# Patient Record
Sex: MALE | Race: WHITE | NOT HISPANIC OR LATINO | Employment: FULL TIME | URBAN - METROPOLITAN AREA
[De-identification: names, ages, dates, MRNs, and addresses within clinical notes are randomized per-mention and may not be internally consistent; named-entity substitution may affect disease eponyms.]

---

## 2017-01-26 DIAGNOSIS — E78.5 HYPERLIPIDEMIA LDL GOAL <100: ICD-10-CM

## 2017-01-27 NOTE — TELEPHONE ENCOUNTER
lisinopril (PRINIVIL,ZESTRIL) 20 MG tablet      Last Written Prescription Date: 12/28/2015  Last Fill Quantity: 90, # refills: 3  [place] [facility] [facility] or [facility] prescribing provider: 2/8/2016       POTASSIUM   Date Value Ref Range Status   09/21/2015 4.0 3.4 - 5.3 mmol/L Final     CREATININE   Date Value Ref Range Status   09/21/2015 0.74 0.66 - 1.25 mg/dL Final     BP Readings from Last 3 Encounters:   02/08/16 118/80   01/14/16 139/89   12/30/15 132/82     atorvastatin (LIPITOR) 20 MG tablet     Last Written Prescription Date: 12/28/2015  Last Fill Quantity: 90, # refills: 3  [place] [facility] [facility] or [facility] prescribing provider: 2/8/2016       CHOL      202   1/16/2015  HDL       36   1/16/2015  LDL      128   1/16/2015  LDL      114   5/21/2009  TRIG      192   1/16/2015  CHOLHDLRATIO      5.6   1/16/2015  metFORMIN (GLUCOPHAGE) 500 MG tablet         Last Written Prescription Date: 12/28/2015  Last Fill Quantity: 360, # refills: 4  Last [place] [facility] [facility] or [facility] prescribing provider:  2/8/2016        BP Readings from Last 3 Encounters:   02/08/16 118/80   01/14/16 139/89   12/30/15 132/82     MICROL       <5   4/23/2015  No results found for this basename: microalbumin  CREATININE   Date Value Ref Range Status   09/21/2015 0.74 0.66 - 1.25 mg/dL Final   ]  GFR ESTIMATE   Date Value Ref Range Status   09/21/2015 >90  Non  GFR Calc   >60 mL/min/1.7m2 Final   01/16/2015 >90  Non  GFR Calc   >60 mL/min/1.7m2 Final   04/13/2010 85 >60 mL/min/1.7m2 Final     GFR ESTIMATE IF BLACK   Date Value Ref Range Status   09/21/2015 >90   GFR Calc   >60 mL/min/1.7m2 Final   01/16/2015 >90   GFR Calc   >60 mL/min/1.7m2 Final   04/13/2010 >90 >60 mL/min/1.7m2 Final     CHOL      202   1/16/2015  HDL       36   1/16/2015  LDL      128   1/16/2015  LDL      114   5/21/2009  TRIG      192   1/16/2015  CHOLHDLRATIO      5.6    1/16/2015  No results found for this basename: ast  ALT       40   12/21/2009  A1C     11.5   12/28/2015  A1C     10.4   9/21/2015  A1C     12.2   4/23/2015  A1C     13.4   1/15/2015  A1C      6.7   4/13/2010  POTASSIUM   Date Value Ref Range Status   09/21/2015 4.0 3.4 - 5.3 mmol/L Final

## 2017-01-30 RX ORDER — ATORVASTATIN CALCIUM 20 MG/1
TABLET, FILM COATED ORAL
Qty: 90 TABLET | Refills: 0 | Status: SHIPPED | OUTPATIENT
Start: 2017-01-30 | End: 2018-04-26

## 2017-01-30 RX ORDER — LISINOPRIL 20 MG/1
TABLET ORAL
Qty: 90 TABLET | Refills: 0 | Status: SHIPPED | OUTPATIENT
Start: 2017-01-30 | End: 2017-04-04

## 2017-01-30 NOTE — TELEPHONE ENCOUNTER
Medication is being filled for 1 time refill only due to:  Patient needs to be seen because it will be over a year since last seen in Feb.     Signed Prescriptions:                        Disp   Refills    metFORMIN (GLUCOPHAGE) 500 MG tablet       360 ta*0        Sig: TAKE 2 TABLETS BY MOUTH TWICE DAILY WITH MEALS  Authorizing Provider: CEZAR ROBLES  Ordering User: VANI JULIAN    atorvastatin (LIPITOR) 20 MG tablet        90 tab*0        Sig: TAKE 1 TABLET BY MOUTH EVERY DAY.  Authorizing Provider: CEZAR ROBLES  Ordering User: VANI JULIAN    lisinopril (PRINIVIL/ZESTRIL) 20 MG tablet 90 tab*0        Sig: TAKE 1 TABLET BY MOUTH EVERY DAY.  Authorizing Provider: CEZAR ROBLES  Ordering User: VANI JULIAN RN, BSN

## 2017-02-08 ENCOUNTER — TELEPHONE (OUTPATIENT)
Dept: FAMILY MEDICINE | Facility: CLINIC | Age: 52
End: 2017-02-08

## 2017-02-08 DIAGNOSIS — E78.5 HYPERLIPIDEMIA LDL GOAL <100: ICD-10-CM

## 2017-02-08 RX ORDER — LISINOPRIL 20 MG/1
TABLET ORAL
Qty: 90 TABLET | Refills: 0 | Status: CANCELLED | OUTPATIENT
Start: 2017-02-08

## 2017-02-08 RX ORDER — ATORVASTATIN CALCIUM 20 MG/1
TABLET, FILM COATED ORAL
Qty: 90 TABLET | Refills: 0 | Status: CANCELLED | OUTPATIENT
Start: 2017-02-08

## 2017-02-08 NOTE — TELEPHONE ENCOUNTER
Per Patient:   Additional comments: Please call if some can be filled. Can you supply some till April? He can not afford a Dr visit now. He is feeling good at this time. 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 He is watching his diet        Metformin 500mg         Last Written Prescription Date: 01/30/2017  Last Fill Quantity: 360, # refills: 0  Last Office Visit with [facility], [facility] or [facility] prescribing provider:  02/06/2016        BP Readings from Last 3 Encounters:   02/08/16 118/80   01/14/16 139/89   12/30/15 132/82     MICROL       <5   4/23/2015  No results found for this basename: microalbumin  CREATININE   Date Value Ref Range Status   09/21/2015 0.74 0.66 - 1.25 mg/dL Final   ]  GFR ESTIMATE   Date Value Ref Range Status   09/21/2015 >90  Non  GFR Calc   >60 mL/min/1.7m2 Final   01/16/2015 >90  Non  GFR Calc   >60 mL/min/1.7m2 Final   04/13/2010 85 >60 mL/min/1.7m2 Final     GFR ESTIMATE IF BLACK   Date Value Ref Range Status   09/21/2015 >90   GFR Calc   >60 mL/min/1.7m2 Final   01/16/2015 >90   GFR Calc   >60 mL/min/1.7m2 Final   04/13/2010 >90 >60 mL/min/1.7m2 Final     CHOL      202   1/16/2015  HDL       36   1/16/2015  LDL      128   1/16/2015  LDL      114   5/21/2009  TRIG      192   1/16/2015  CHOLHDLRATIO      5.6   1/16/2015  No results found for this basename: ast  ALT       40   12/21/2009  A1C     11.5   12/28/2015  A1C     10.4   9/21/2015  A1C     12.2   4/23/2015  A1C     13.4   1/15/2015  A1C      6.7   4/13/2010  POTASSIUM   Date Value Ref Range Status   09/21/2015 4.0 3.4 - 5.3 mmol/L Final       Lisinopril 20mg       Last Written Prescription Date: 01/30/2017  Last Fill Quantity: 90, # refills: 0  Last Office Visit with [facility], [facility] or [facility] prescribing provider: 02/06/2016       POTASSIUM   Date Value Ref Range Status   09/21/2015 4.0 3.4 - 5.3 mmol/L Final     CREATININE   Date Value Ref Range Status   09/21/2015 0.74  0.66 - 1.25 mg/dL Final     BP Readings from Last 3 Encounters:   02/08/16 118/80   01/14/16 139/89   12/30/15 132/82     Atorvastatin 20mg      Last Written Prescription Date: 01/30/2017  Last Fill Quantity: 90, # refills: 0  Last Office Visit with [facility] or [facility] prescribing provider: 02/06/2016       CHOL      202   1/16/2015  HDL       36   1/16/2015  LDL      128   1/16/2015  LDL      114   5/21/2009  TRIG      192   1/16/2015  CHOLHDLRATIO      5.6   1/16/2015  Dorothy Beyer MA

## 2017-02-08 NOTE — TELEPHONE ENCOUNTER
Reason for call: Other   Patient called regarding (reason for call): prescription refills on metformin 500 mg, lisinopril 20 mg, and atorvastatin 20 mg  Additional comments: Please call if some can be filled. Can you supply some till April? He can not afford a Dr visit now. He is feeling good at this time. 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 He is watching his diet    Phone Number Pt can be reached at:[phone]  Best Time: anytime or 2:45-4 pm is best for talking  Can we leave a detailed message on this number? YES

## 2017-02-08 NOTE — TELEPHONE ENCOUNTER
Routing refill request to provider for review/approval because:  Labs not current:  Diabetic labs,   Patient due for f/u visit- see note below patient cannot afford visit at this time.     Radha Calderon RN

## 2017-02-11 NOTE — TELEPHONE ENCOUNTER
Call him. He must be seen. I haven't seen him in over 1 year. There is no way I can prescribe medications for him if I don't have him in the office for an exam, vital signs, and lab tests.     CEZAR ROBLES M.D.

## 2017-04-04 ENCOUNTER — OFFICE VISIT (OUTPATIENT)
Dept: FAMILY MEDICINE | Facility: CLINIC | Age: 52
End: 2017-04-04
Payer: COMMERCIAL

## 2017-04-04 VITALS
TEMPERATURE: 98.1 F | BODY MASS INDEX: 28.44 KG/M2 | HEIGHT: 69 IN | OXYGEN SATURATION: 98 % | SYSTOLIC BLOOD PRESSURE: 135 MMHG | HEART RATE: 84 BPM | WEIGHT: 192 LBS | DIASTOLIC BLOOD PRESSURE: 87 MMHG

## 2017-04-04 DIAGNOSIS — Z00.00 ROUTINE GENERAL MEDICAL EXAMINATION AT A HEALTH CARE FACILITY: Primary | ICD-10-CM

## 2017-04-04 DIAGNOSIS — E66.3 OVERWEIGHT (BMI 25.0-29.9): ICD-10-CM

## 2017-04-04 DIAGNOSIS — Z13.89 SCREENING FOR DIABETIC PERIPHERAL NEUROPATHY: ICD-10-CM

## 2017-04-04 DIAGNOSIS — E78.5 HYPERLIPIDEMIA LDL GOAL <100: ICD-10-CM

## 2017-04-04 DIAGNOSIS — Z11.59 NEED FOR HEPATITIS C SCREENING TEST: ICD-10-CM

## 2017-04-04 DIAGNOSIS — I10 HYPERTENSION GOAL BP (BLOOD PRESSURE) < 140/90: ICD-10-CM

## 2017-04-04 LAB
ALBUMIN UR-MCNC: NEGATIVE MG/DL
APPEARANCE UR: CLEAR
BASOPHILS # BLD AUTO: 0.1 10E9/L (ref 0–0.2)
BASOPHILS NFR BLD AUTO: 0.7 %
BILIRUB UR QL STRIP: NEGATIVE
COLOR UR AUTO: YELLOW
DIFFERENTIAL METHOD BLD: ABNORMAL
EOSINOPHIL # BLD AUTO: 0.4 10E9/L (ref 0–0.7)
EOSINOPHIL NFR BLD AUTO: 4.5 %
ERYTHROCYTE [DISTWIDTH] IN BLOOD BY AUTOMATED COUNT: 12.8 % (ref 10–15)
GLUCOSE BLD-MCNC: 217 MG/DL (ref 70–99)
GLUCOSE UR STRIP-MCNC: >=1000 MG/DL
HBA1C MFR BLD: 11.4 % (ref 4.3–6)
HCT VFR BLD AUTO: 42 % (ref 40–53)
HGB BLD-MCNC: 15.5 G/DL (ref 13.3–17.7)
HGB UR QL STRIP: NEGATIVE
KETONES UR STRIP-MCNC: NEGATIVE MG/DL
LEUKOCYTE ESTERASE UR QL STRIP: NEGATIVE
LYMPHOCYTES # BLD AUTO: 4.2 10E9/L (ref 0.8–5.3)
LYMPHOCYTES NFR BLD AUTO: 44.2 %
MCH RBC QN AUTO: 30.9 PG (ref 26.5–33)
MCHC RBC AUTO-ENTMCNC: 36.9 G/DL (ref 31.5–36.5)
MCV RBC AUTO: 84 FL (ref 78–100)
MONOCYTES # BLD AUTO: 0.6 10E9/L (ref 0–1.3)
MONOCYTES NFR BLD AUTO: 6.5 %
NEUTROPHILS # BLD AUTO: 4.2 10E9/L (ref 1.6–8.3)
NEUTROPHILS NFR BLD AUTO: 44.1 %
NITRATE UR QL: NEGATIVE
PH UR STRIP: 6 PH (ref 5–7)
PLATELET # BLD AUTO: 261 10E9/L (ref 150–450)
RBC # BLD AUTO: 5.01 10E12/L (ref 4.4–5.9)
SP GR UR STRIP: <=1.005 (ref 1–1.03)
URN SPEC COLLECT METH UR: ABNORMAL
UROBILINOGEN UR STRIP-ACNC: 0.2 EU/DL (ref 0.2–1)
WBC # BLD AUTO: 9.6 10E9/L (ref 4–11)

## 2017-04-04 PROCEDURE — 99213 OFFICE O/P EST LOW 20 MIN: CPT | Mod: 25 | Performed by: INTERNAL MEDICINE

## 2017-04-04 PROCEDURE — 80050 GENERAL HEALTH PANEL: CPT | Performed by: INTERNAL MEDICINE

## 2017-04-04 PROCEDURE — 86803 HEPATITIS C AB TEST: CPT | Performed by: INTERNAL MEDICINE

## 2017-04-04 PROCEDURE — 36415 COLL VENOUS BLD VENIPUNCTURE: CPT | Performed by: INTERNAL MEDICINE

## 2017-04-04 PROCEDURE — 82043 UR ALBUMIN QUANTITATIVE: CPT | Performed by: INTERNAL MEDICINE

## 2017-04-04 PROCEDURE — 80061 LIPID PANEL: CPT | Performed by: INTERNAL MEDICINE

## 2017-04-04 PROCEDURE — 99207 C FOOT EXAM  NO CHARGE: CPT | Mod: 25 | Performed by: INTERNAL MEDICINE

## 2017-04-04 PROCEDURE — 81003 URINALYSIS AUTO W/O SCOPE: CPT | Performed by: INTERNAL MEDICINE

## 2017-04-04 PROCEDURE — 99396 PREV VISIT EST AGE 40-64: CPT | Performed by: INTERNAL MEDICINE

## 2017-04-04 PROCEDURE — 83036 HEMOGLOBIN GLYCOSYLATED A1C: CPT | Performed by: INTERNAL MEDICINE

## 2017-04-04 PROCEDURE — 82947 ASSAY GLUCOSE BLOOD QUANT: CPT | Performed by: INTERNAL MEDICINE

## 2017-04-04 RX ORDER — ATORVASTATIN CALCIUM 40 MG/1
40 TABLET, FILM COATED ORAL DAILY
Qty: 90 TABLET | Refills: 3 | Status: SHIPPED | OUTPATIENT
Start: 2017-04-04 | End: 2018-04-18

## 2017-04-04 RX ORDER — GLIPIZIDE 5 MG/1
5 TABLET ORAL 2 TIMES DAILY WITH MEALS
Qty: 180 TABLET | Refills: 1 | Status: SHIPPED | OUTPATIENT
Start: 2017-04-04 | End: 2017-12-01

## 2017-04-04 RX ORDER — LISINOPRIL 20 MG/1
20 TABLET ORAL DAILY
Qty: 90 TABLET | Refills: 3 | Status: SHIPPED | OUTPATIENT
Start: 2017-04-04 | End: 2018-04-18

## 2017-04-04 NOTE — NURSING NOTE
"Chief Complaint   Patient presents with     Physical       Initial /87 (BP Location: Right arm, Patient Position: Chair, Cuff Size: Adult Regular)  Pulse 84  Temp 98.1  F (36.7  C) (Oral)  Ht 5' 8.5\" (1.74 m)  Wt 192 lb (87.1 kg)  SpO2 98%  BMI 28.77 kg/m2 Estimated body mass index is 28.77 kg/(m^2) as calculated from the following:    Height as of this encounter: 5' 8.5\" (1.74 m).    Weight as of this encounter: 192 lb (87.1 kg).  Medication Reconciliation: complete     Feliz Altamirano MA      "

## 2017-04-04 NOTE — PATIENT INSTRUCTIONS
Preventive Health Recommendations  Male Ages 50   64    Yearly exam:             See your health care provider every year in order to  o   Review health changes.   o   Discuss preventive care.    o   Review your medicines if your doctor has prescribed any.     Have a cholesterol test every 5 years, or more frequently if you are at risk for high cholesterol/heart disease.     Have a diabetes test (fasting glucose) every three years. If you are at risk for diabetes, you should have this test more often.     Have a colonoscopy at age 50, or have a yearly FIT test (stool test). These exams will check for colon cancer.      Talk with your health care provider about whether or not a prostate cancer screening test (PSA) is right for you.    You should be tested each year for STDs (sexually transmitted diseases), if you re at risk.     Shots: Get a flu shot each year. Get a tetanus shot every 10 years.     Nutrition:    Eat at least 5 servings of fruits and vegetables daily.     Eat whole-grain bread, whole-wheat pasta and brown rice instead of white grains and rice.     Talk to your provider about Calcium and Vitamin D.     Lifestyle    Exercise for at least 150 minutes a week (30 minutes a day, 5 days a week). This will help you control your weight and prevent disease.     Limit alcohol to one drink per day.     No smoking.     Wear sunscreen to prevent skin cancer.     See your dentist every six months for an exam and cleaning.     See your eye doctor every 1 to 2 years.  This summary includes the important diagnoses, test, medications and other important parts of your medical history.  Below are a few good we sites you can use to learn more about these.     Www.[employer].org : Up to date and easily searchable information on multiple topics.  Www.[employer].org/Pharmacy/c_539084.asp : [employer] [facility] $4.99 medications  Www.CatchTheEye.gov : medication info, interactive tutorials, watch real surgeries  online  Www.familydoctor.org : good info from the Academy of Family Physicians  Www.mayoclinic.com : good info from the [facility]  Www.cdc.gov : public health info, travel advisories, epidemics (H1N1)  Www.aap.org : children's health info, normal development, vaccinations  Www.health.state.[place].us : [place] dept of heat, public health issues in [place], N1N1    Based on your medical history and these are the current health maintenance or preventive care services that you are due for (some may have been done at this visit:)  Health Maintenance Due   Topic Date Due     HEPATITIS C SCREENING  09[birth year]07[birth year]1981     LIPID MONITORING Q1 YEAR( NO INBASKET)  01/16/2016     MICROALBUMIN Q1 YEAR( NO INBASKET)  04/23/2016     EYE EXAM Q1 YEAR( NO INBASKET)  06/02/2016     A1C Q6 MO( NO INBASKET)  06/28/2016     CREATININE Q1 YEAR (NO INBASKET)  09/21/2016     FOOT EXAM Q1 YEAR( NO INBASKET)  12/28/2016     TSH W/ FREE T4 REFLEX Q2 YEAR (NO INBASKET)  04/23/2017     =================================================================================  Normal Values   Blood pressure  <140/90 for most adults    <130/80 for some chronic diseases (ask your care team about yours)    BMI (body mass index)  18.5-25 kg/m2 (based on height and weight)     Thank you for visiting [facility]    Normal or non-critical lab and imaging results will be communicated to you by MyChart, letter or phone within 7 days.  If you do not hear from us within 10 days, please call the clinic. If you have a critical or abnormal lab result, we will notify you by phone as soon as possible.     If you have any questions regarding your visit please contact:     Team Comfort:   Clinic Hours Telephone Number   Dr. Johnny Santiago   7am-5pm  Monday - Friday [phone]  [facility]   Pharmacy 8am-8pm Monday-Thursday      8am-6pm Friday  9am-5pm Saturday-Sunday [phone]   Urgent Care  11am-8pm Monday-Friday        9am-5pm Saturday-Sunday [phone]     After hours, weekend or if you need to make an appointment with your primary provider please call [phone].   After Hours nurse advise: call [facility]: [phone]    Medication Refills:  Call your pharmacy and they will forward the refill to us. Please allow 3 business days for your refills to be completed.    Use [email or web address] (secure email communication and access to your chart) to send your primary care provider a message or make an appointment. Ask someone on your Team how to sign up for [email or web address]. To log on to [employer] or for more information in [employer] please visit the website at [email or web address].  As of October 8, 2013, all password changes, disabled accounts, or ID changes in [email or web address] will be done by our Access Services Department.   If you need help with your account or password, call: [phone]. Clinic staff no longer has the ability to change passwords.

## 2017-04-04 NOTE — MR AVS SNAPSHOT
After Visit Summary   4/4/2017    Samson Camarillo    MRN: [id number]           Patient Information     Date Of Birth          1965        Visit Information        Provider Department      4/4/2017 5:00 PM Mick Mariano MD [facility]        Today's Diagnoses     Routine general medical examination at a health care facility    -  1    Uncontrolled type 2 diabetes with neuropathy (H)        Hypertension goal BP (blood pressure) < 140/90        Need for hepatitis C screening test        Screening for diabetic peripheral neuropathy        Needs smoking cessation education        Hyperlipidemia LDL goal <100        Overweight (BMI 25.0-29.9)          Care Instructions      Preventive Health Recommendations  Male Ages 50 - 64    Yearly exam:             See your health care provider every year in order to  o   Review health changes.   o   Discuss preventive care.    o   Review your medicines if your doctor has prescribed any.     Have a cholesterol test every 5 years, or more frequently if you are at risk for high cholesterol/heart disease.     Have a diabetes test (fasting glucose) every three years. If you are at risk for diabetes, you should have this test more often.     Have a colonoscopy at age 50, or have a yearly FIT test (stool test). These exams will check for colon cancer.      Talk with your health care provider about whether or not a prostate cancer screening test (PSA) is right for you.    You should be tested each year for STDs (sexually transmitted diseases), if you re at risk.     Shots: Get a flu shot each year. Get a tetanus shot every 10 years.     Nutrition:    Eat at least 5 servings of fruits and vegetables daily.     Eat whole-grain bread, whole-wheat pasta and brown rice instead of white grains and rice.     Talk to your provider about Calcium and Vitamin D.     Lifestyle    Exercise for at least 150 minutes a week (30 minutes a day, 5 days a week). This  will help you control your weight and prevent disease.     Limit alcohol to one drink per day.     No smoking.     Wear sunscreen to prevent skin cancer.     See your dentist every six months for an exam and cleaning.     See your eye doctor every 1 to 2 years.  This summary includes the important diagnoses, test, medications and other important parts of your medical history.  Below are a few good we sites you can use to learn more about these.     Www.Geliyoo.OPTIMIZERx : Up to date and easily searchable information on multiple topics.  Www.Geliyoo.OPTIMIZERx/Pharmacy/c_539084.asp : [facility] $4.99 medications  Www.Performable.gov : medication info, interactive tutorials, watch real surgeries online  Www.familydoctor.org : good info from the Academy of Family Physicians  Www.TeachScape.Capevo : good info from the [facility]  Www.cdc.gov : public health info, travel advisories, epidemics (H1N1)  Www.aap.org : children's health info, normal development, vaccinations  Www.health.[facility].[place].us : [place] dept of heatl, public health issues in [place], N1N1    Based on your medical history and these are the current health maintenance or preventive care services that you are due for (some may have been done at this visit:)  Health Maintenance Due   Topic Date Due     HEPATITIS C SCREENING  09/07/1981     LIPID MONITORING Q1 YEAR( NO INBASKET)  01/16/2016     MICROALBUMIN Q1 YEAR( NO INBASKET)  04/23/2016     EYE EXAM Q1 YEAR( NO INBASKET)  06/02/2016     A1C Q6 MO( NO INBASKET)  06/28/2016     CREATININE Q1 YEAR (NO INBASKET)  09/21/2016     FOOT EXAM Q1 YEAR( NO INBASKET)  12/28/2016     TSH W/ FREE T4 REFLEX Q2 YEAR (NO INBASKET)  04/23/2017     =================================================================================  Normal Values   Blood pressure  <140/90 for most adults    <130/80 for some chronic diseases (ask your care team about yours)    BMI (body mass index)  18.5-25 kg/m2 (based on height and weight)     Thank you  for visiting [facility]    Normal or non-critical lab and imaging results will be communicated to you by MyChart, letter or phone within 7 days.  If you do not hear from us within 10 days, please call the clinic. If you have a critical or abnormal lab result, we will notify you by phone as soon as possible.     If you have any questions regarding your visit please contact:     Team Comfort:   Clinic Hours Telephone Number   Dr. Johnny Santiago   7am-5pm  Monday - Friday [phone]  Efrain RN   Pharmacy 8am-8pm Monday-Thursday      8am-6pm Friday  9am-5pm Saturday-Sunday [phone]   Urgent Care 11am-8pm Monday-Friday        9am-5pm Saturday-Sunday [phone]     After hours, weekend or if you need to make an appointment with your primary provider please call [phone].   After Hours nurse advise: call [facility]: [phone]    Medication Refills:  Call your pharmacy and they will forward the refill to us. Please allow 3 business days for your refills to be completed.    Use Fortem (secure email communication and access to your chart) to send your primary care provider a message or make an appointment. Ask someone on your Team how to sign up for Fortem. To log on to BountyHunter or for more information in LiveOps please visit the website at [email or web address].  As of October 8, 2013, all password changes, disabled accounts, or ID changes in Fortem/MyHealth will be done by our Access Services Department.   If you need help with your account or password, call: [phone]. Clinic staff no longer has the ability to change passwords.           Follow-ups after your visit        Who to contact     If you have questions or need follow up information about today's clinic visit or your schedule please contact [facility] directly at [phone].  Normal or non-critical lab and  "imaging results will be communicated to you by MyChart, letter or phone within 4 business days after the clinic has received the results. If you do not hear from us within 7 days, please contact the clinic through Graftworxt or phone. If you have a critical or abnormal lab result, we will notify you by phone as soon as possible.  Submit refill requests through [email or web address] or call your pharmacy and they will forward the refill request to us. Please allow 3 business days for your refill to be completed.          Additional Information About Your Visit        Enviable AbodeharLumiGrow Information     [email or web address] lets you send messages to your doctor, view your test results, renew your prescriptions, schedule appointments and more. To sign up, go to [email or web address] . Click on \"Log in\" on the left side of the screen, which will take you to the Welcome page. Then click on \"Sign up Now\" on the right side of the page.     You will be asked to enter the access code listed below, as well as some personal information. Please follow the directions to create your username and password.     Your access code is: [email or web address]  Expires: 7/3/2017  4:51 PM     Your access code will  in 90 days. If you need help or a new code, please call your [facility] clinic or [phone].        Care EveryWhere ID     This is your Care EveryWhere ID. This could be used by other organizations to access your [facility] medical records  [id number]        Your Vitals Were     Pulse Temperature Height Pulse Oximetry BMI (Body Mass Index)       84 98.1  F (36.7  C) (Oral) 5' 8.5\" (1.74 m) 98% 28.77 kg/m2        Blood Pressure from Last 3 Encounters:   17 135/87   16 118/80   16 139/89    Weight from Last 3 Encounters:   17 192 lb (87.1 kg)   16 186 lb (84.4 kg)   16 184 lb 3.2 oz (83.6 kg)              We Performed the Following     Albumin Random Urine Quantitative     CBC with platelets and differential     Comprehensive metabolic " panel (BMP + Alb, Alk Phos, ALT, AST, Total. Bili, TP)     FOOT EXAM  NO CHARGE [13502.114]     FOOT EXAM     Glucose, whole blood     Hemoglobin A1c     Hepatitis C Screen Reflex to HCV RNA Quant and Genotype     Lipid panel reflex to direct LDL     TSH WITH FREE T4 REFLEX     UA reflex to Microscopic          Today's Medication Changes          These changes are accurate as of: 4/4/17  6:48 PM.  If you have any questions, ask your nurse or doctor.               Start taking these medicines.        Dose/Directions    glipiZIDE 5 MG tablet   Commonly known as:  GLUCOTROL   Used for:  Uncontrolled type 2 diabetes with neuropathy (H)   Started by:  Mick Mariano MD        Dose:  5 mg   Take 1 tablet (5 mg) by mouth 2 times daily (with meals) Take with Metformin.   Quantity:  180 tablet   Refills:  1       metFORMIN 1000 MG tablet   Commonly known as:  GLUCOPHAGE   Used for:  Uncontrolled type 2 diabetes with neuropathy (H)   Started by:  Mick Mariano MD        Dose:  1000 mg   Take 1 tablet (1,000 mg) by mouth 2 times daily (with meals)   Quantity:  180 tablet   Refills:  1         These medicines have changed or have updated prescriptions.        Dose/Directions    * atorvastatin 20 MG tablet   Commonly known as:  LIPITOR   This may have changed:  Another medication with the same name was added. Make sure you understand how and when to take each.   Used for:  Hyperlipidemia LDL goal <100   Changed by:  Pippa Quiroz MD        TAKE 1 TABLET BY MOUTH EVERY DAY.   Quantity:  90 tablet   Refills:  0       * atorvastatin 40 MG tablet   Commonly known as:  LIPITOR   This may have changed:  You were already taking a medication with the same name, and this prescription was added. Make sure you understand how and when to take each.   Used for:  Hyperlipidemia LDL goal <100   Changed by:  Mick Mariano MD        Dose:  40 mg   Take 1 tablet (40 mg) by mouth daily   Quantity:  90 tablet    Refills:  3       lisinopril 20 MG tablet   Commonly known as:  PRINIVIL/ZESTRIL   This may have changed:  See the new instructions.   Used for:  Hypertension goal BP (blood pressure) < 140/90   Changed by:  Mick Mariano MD        Dose:  20 mg   Take 1 tablet (20 mg) by mouth daily   Quantity:  90 tablet   Refills:  3       * Notice:  This list has 2 medication(s) that are the same as other medications prescribed for you. Read the directions carefully, and ask your doctor or other care provider to review them with you.         Where to get your medicines      These medications were sent to [id number] - [place], [place] - [address] [facility]  [address] , [place] [address]    Hours:  24-hours Phone:  [phone]     atorvastatin 40 MG tablet    glipiZIDE 5 MG tablet    lisinopril 20 MG tablet    metFORMIN 1000 MG tablet                Primary Care Provider Office Phone # Fax #    Mick Mariano [phone]       [facility] [address]  [facility] [address]        Thank you!     Thank you for choosing [facility]  for your care. Our goal is always to provide you with excellent care. Hearing back from our patients is one way we can continue to improve our services. Please take a few minutes to complete the written survey that you may receive in the mail after your visit with us. Thank you!             Your Updated Medication List - Protect others around you: Learn how to safely use, store and throw away your medicines at www.disposemymeds.org.          This list is accurate as of: 4/4/17  6:48 PM.  Always use your most recent med list.                   Brand Name Dispense Instructions for use    aspirin 81 MG tablet      Take by mouth daily (with dinner)       * atorvastatin 20 MG tablet    LIPITOR    90 tablet    TAKE 1 TABLET BY MOUTH EVERY DAY.       * atorvastatin 40  MG tablet    LIPITOR    90 tablet    Take 1 tablet (40 mg) by mouth daily       SONA CONTOUR test strip   Generic drug:  blood glucose monitoring     200 each    TEST TWICE DAILY       glipiZIDE 5 MG tablet    GLUCOTROL    180 tablet    Take 1 tablet (5 mg) by mouth 2 times daily (with meals) Take with Metformin.       lisinopril 20 MG tablet    PRINIVIL/ZESTRIL    90 tablet    Take 1 tablet (20 mg) by mouth daily       metFORMIN 1000 MG tablet    GLUCOPHAGE    180 tablet    Take 1 tablet (1,000 mg) by mouth 2 times daily (with meals)       nicotine 14 MG/24HR 24 hr patch    NICODERM CQ    30 patch    Place 1 patch onto the skin every 24 hours       order for DME     1 each    Equipment being ordered:  Glucose monitor and test strips to match . Must test twice a day Dispense one kit and 3 months of test strips with 4 refills.       * Notice:  This list has 2 medication(s) that are the same as other medications prescribed for you. Read the directions carefully, and ask your doctor or other care provider to review them with you.

## 2017-04-04 NOTE — PROGRESS NOTES
SUBJECTIVE:     CC: Samson Camarillo is an 51 year old male who presents for preventative health visit.     Healthy Habits:    Do you get at least three servings of calcium containing foods daily (dairy, green leafy vegetables, etc.)? yes    Amount of exercise or daily activities, outside of work: 0 day(s) per week    Problems taking medications regularly No    Medication side effects: No    Have you had an eye exam in the past two years? yes    Do you see a dentist twice per year? yes  Do you have sleep apnea, excessive snoring or daytime drowsiness?yes snore      Diabetes Follow-up      Patient is checking blood sugars: not at all    Diabetic concerns: None since he doesn't check his glucose levels.     Symptoms of hypoglycemia (low blood sugar): none     Paresthesias (numbness or burning in feet) or sores: No     Date of last diabetic eye exam: Due       Amount of exercise or physical activity: None    Problems taking medications regularly: Yes, no refills since no appointments since 2015.    Medication side effects: none from Metformin.    Diet: diabetic    Date of diagnosis: 2009, diagnosed during routine visit.    Recent HbA1c: 10.4% last September 2015.     Hypertension Follow-up      Outpatient blood pressures monitoring: no    Low Salt Diet: no    Adverse effects: none since he has not taken Lisinopril for at least one month.    Compliance: poor     Secondary causes: none    Chronic kidney disease: no    Hyperthyroidism: no    Anxiety: no    Decongestants: no    Substance abuse: no    Diabetes: yes    Ischemic heart disease: no    Stroke: no    Hyperlipidemia: yes     Hyperlipidemia follow-up  Taking statins: none recently due to lack of refills  Adverse effects: no  Other medications: none  Supplements: Yes (medications bought from [facility])  Abnormal liver function tests: no  Contraindicated medications: no  Daily alcohol use: no  Aspirin: no          -------------------------------------    Today's PHQ-2  Score:   PHQ-2 ( [facility]) 2/8/2016 1/14/2016   Q1: Little interest or pleasure in doing things 0 0   Q2: Feeling down, depressed or hopeless 0 0   PHQ-2 Score 0 0       Abuse: Current or Past(Physical, Sexual or Emotional)- No  Do you feel safe in your environment - Yes    Social History   Substance Use Topics     Smoking status: Current Every Day Smoker     Types: Cigarettes     Smokeless tobacco: Never Used      Comment: 1 pack per week     Alcohol use 0.0 oz/week     0 Standard drinks or equivalent per week      Comment: 1 drink per month     The patient does not drink >3 drinks per day nor >7 drinks per week.    Last PSA: No results found for: PSA    Recent Labs   Lab Test  01/16/15   1113  04/13/10   1320   CHOL  202*  206*   HDL  36*  28*   LDL  128  125   TRIG  192*  264*   CHOLHDLRATIO  5.6*  7.3*       Reviewed orders with patient. Reviewed health maintenance and updated orders accordingly - Yes    Reviewed and updated as needed this visit by clinical staff         Reviewed and updated as needed this visit by Provider            ROS:  C: NEGATIVE for fever, chills, change in weight  I: NEGATIVE for worrisome rashes, moles or lesions  E: NEGATIVE for vision changes or irritation  ENT: NEGATIVE for ear, mouth and throat problems  R: NEGATIVE for significant cough or SOB  CV: NEGATIVE for chest pain, palpitations or peripheral edema  GI: NEGATIVE for nausea, abdominal pain, heartburn, or change in bowel habits   male: negative for dysuria, hematuria, decreased urinary stream, erectile dysfunction, urethral discharge  M: NEGATIVE for significant arthralgias or myalgia  NEURO: POSITIVE for paresthesia of right big toe.NEGATIVE for dizziness/lightheadedness, dysarthria, involuntary movements, gait disturbance, radicular pain , syncope, tremor  and weakness   E: NEGATIVE for temperature intolerance, skin/hair changes  H: NEGATIVE for bleeding problems  P: NEGATIVE for changes in mood or affect    Problem  list, Medication list, Allergies, and Medical/Social/Surgical histories reviewed in EPIC and updated as appropriate.  Labs reviewed in EPIC  BP Readings from Last 3 Encounters:   04/04/17 135/87   02/08/16 118/80   01/14/16 139/89    Wt Readings from Last 3 Encounters:   04/04/17 192 lb (87.1 kg)   03/16/16 186 lb (84.4 kg)   02/24/16 184 lb 3.2 oz (83.6 kg)                  Patient Active Problem List   Diagnosis     Chemical dependency (H)     Anxiety     Psoriasis     HYPERLIPIDEMIA LDL GOAL <100     Erectile dysfunction     Type 2 diabetes mellitus, uncontrolled (H)     Hypertension goal BP (blood pressure) < 140/90     Overweight (BMI 25.0-29.9)     Uncontrolled type 2 diabetes with neuropathy (H)     Past Surgical History:   Procedure Laterality Date     C AFF PREP FACE/ORAL PROST UNLISTED       COLONOSCOPY N/A 6/15/2015    Procedure: COLONOSCOPY;  Surgeon: Jonnie Dwyer MD;  Location: [facility] OR     COLONOSCOPY WITH CO2 INSUFFLATION N/A 6/15/2015    Procedure: COLONOSCOPY WITH CO2 INSUFFLATION;  Surgeon: Jonnie Dwyer MD;  Location: [facility] OR     ECHO STRESS TEST  2010     ENDOSCOPIC SINUS SURGERY  2/2015    right maxillary sinus     SINUS SURGERY Bilateral 2-18-16    Dr. Quesada     SINUS SURGERY  01/2016       Social History   Substance Use Topics     Smoking status: Current Every Day Smoker     Types: Cigarettes     Smokeless tobacco: Never Used      Comment: 1 pack per week     Alcohol use 0.0 oz/week     0 Standard drinks or equivalent per week      Comment: 1 drink per month     Family History   Problem Relation Age of Onset     Hypertension Mother      HEART DISEASE Mother      Thyroid Disease Mother      KIDNEY DISEASE Mother      stage 2 to 3     DIABETES Father      Hypertension Father      CEREBROVASCULAR DISEASE Father      DIABETES Maternal Grandfather      CANCER Paternal Grandmother      HEART DISEASE Paternal Grandfather          Allergies   Allergen Reactions     Nkda [No Known Drug  "Allergies]      Recent Labs   Lab Test  04/04/17   1801  12/28/15   1100  09/21/15   1251  04/23/15   1239  01/16/15   1113   04/13/10   1320  12/21/09   1101  09/14/09   0954   A1C  11.4*  11.5*  10.4*  12.2*   --    < >  6.7*  6.7*  6.6*   LDL  134*   --    --    --   128   --   125  158*  134*   HDL  38*   --    --    --   36*   --   28*  30*  28*   TRIG  247*   --    --    --   192*   --   264*  191*  189*   ALT  44   --    --    --    --    --    --   40  53   CR  0.84   --   0.74   --   0.77   --   0.95   --   0.97   GFRESTIMATED  >90  Non  GFR Calc     --   >90  Non  GFR Calc     --   >90  Non  GFR Calc     --   85   --   83   GFRESTBLACK  >90   GFR Calc     --   >90   GFR Calc     --   >90   GFR Calc     --   >90   --   >90   POTASSIUM  3.8   --   4.0   --   3.9   --    --    --   4.0   TSH  1.74   --    --   1.99   --    --   2.91   --    --     < > = values in this interval not displayed.      OBJECTIVE:     /87 (BP Location: Right arm, Patient Position: Chair, Cuff Size: Adult Regular)  Pulse 84  Temp 98.1  F (36.7  C) (Oral)  Ht 5' 8.5\" (1.74 m)  Wt 192 lb (87.1 kg)  SpO2 98%  BMI 28.77 kg/m2  EXAM:  GENERAL: healthy, alert and no distress  EYES: Eyes grossly normal to inspection  NECK: no adenopathy  RESP: lungs clear to auscultation - no rales, rhonchi or wheezes  CV: regular rate and rhythm, normal S1 S2, no S3 or S4, no murmur, click or rub, no peripheral edema and peripheral pulses strong  ABDOMEN: soft, nontender, no hepatosplenomegaly, no masses and bowel sounds normal  MS: no gross musculoskeletal defects noted, no edema  SKIN: no suspicious lesions or rashes  NEURO: Normal strength and tone, mentation intact and speech normal  PSYCH: mentation appears normal, affect normal/bright  Diabetic foot exam: normal DP and PT pulses, no trophic changes or ulcerative lesions and normal monofilament " exam, except for findings noted on plantar aspect of right big toe.            ASSESSMENT/PLAN:     (Z00.00) Routine general medical examination at a health care facility  (primary encounter diagnosis)  Comment: No family history of colon cancer nor premature atherosclerotic heart disease.  Plan: Lipid panel reflex to direct LDL, TSH WITH FREE        T4 REFLEX, Comprehensive metabolic panel (BMP +        Alb, Alk Phos, ALT, AST, Total. Bili, TP), CBC         with platelets and differential, UA reflex to         Microscopic            (E11.40,  E11.65) Uncontrolled type 2 diabetes with neuropathy (H)  Comment: Due to poor dietary compliance. He does not want insulin. Add sulfonylureas and consider adding third agent such Invokana, Januvia or even thiazolidinediones (TZDs).  Plan: Albumin Random Urine Quantitative,         Comprehensive metabolic panel (BMP + Alb, Alk         Phos, ALT, AST, Total. Bili, TP), Glucose,         whole blood, Hemoglobin A1c, FOOT EXAM,         metFORMIN (GLUCOPHAGE) 1000 MG tablet,         glipiZIDE (GLUCOTROL) 5 MG tablet            (I10) Hypertension goal BP (blood pressure) < 140/90  Comment:   Plan: lisinopril (PRINIVIL/ZESTRIL) 20 MG tablet            (E78.5) Hyperlipidemia LDL goal <100  Comment: Due to high estimated risk of heart disease, increase Atorvastatin to 40 mg once daily and add aspirin 81 mg once daily.  Plan: Lipid panel reflex to direct LDL, atorvastatin         (LIPITOR) 40 MG tablet            (Z11.59) Need for hepatitis C screening test  Comment: One-time screening.  Plan: Hepatitis C Screen Reflex to HCV RNA Quant and         Genotype            (Z13.89) Screening for diabetic peripheral neuropathy  Comment: No evidence of ulcers or calluses.  Plan: FOOT EXAM  NO CHARGE [34150.114]            (E66.3) Overweight (BMI 25.0-29.9)  Comment: Due to sedentary lifestyle.  Plan: TSH WITH FREE T4 REFLEX              COUNSELING:  Special attention given to:        Regular  "exercise       Healthy diet/nutrition       Aspirin Prophylaxsis         reports that he has been smoking Cigarettes.  He has never used smokeless tobacco.  Tobacco Cessation Action Plan: Self help information given to patient  Estimated body mass index is 27.87 kg/(m^2) as calculated from the following:    Height as of 3/16/16: 5' 8.5\" (1.74 m).    Weight as of 3/16/16: 186 lb (84.4 kg).   Weight management plan: diet and exercise.    Counseling Resources:  ATP IV Guidelines  Pooled Cohorts Equation Calculator  FRAX Risk Assessment  ICSI Preventive Guidelines  Dietary Guidelines for Americans, 2010  USDA's MyPlate  ASA Prophylaxis  Lung CA Screening    Mick Mariano MD  [facility]  "

## 2017-04-05 LAB
ALBUMIN SERPL-MCNC: 4.4 G/DL (ref 3.4–5)
ALP SERPL-CCNC: 88 U/L (ref 40–150)
ALT SERPL W P-5'-P-CCNC: 44 U/L (ref 0–70)
ANION GAP SERPL CALCULATED.3IONS-SCNC: 11 MMOL/L (ref 3–14)
AST SERPL W P-5'-P-CCNC: 20 U/L (ref 0–45)
BILIRUB SERPL-MCNC: 0.7 MG/DL (ref 0.2–1.3)
BUN SERPL-MCNC: 13 MG/DL (ref 7–30)
CALCIUM SERPL-MCNC: 9.3 MG/DL (ref 8.5–10.1)
CHLORIDE SERPL-SCNC: 101 MMOL/L (ref 94–109)
CHOLEST SERPL-MCNC: 221 MG/DL
CO2 SERPL-SCNC: 26 MMOL/L (ref 20–32)
CREAT SERPL-MCNC: 0.84 MG/DL (ref 0.66–1.25)
CREAT UR-MCNC: 35 MG/DL
GFR SERPL CREATININE-BSD FRML MDRD: ABNORMAL ML/MIN/1.7M2
GLUCOSE SERPL-MCNC: 244 MG/DL (ref 70–99)
HCV AB SERPL QL IA: NORMAL
HDLC SERPL-MCNC: 38 MG/DL
LDLC SERPL CALC-MCNC: 134 MG/DL
MICROALBUMIN UR-MCNC: 9 MG/L
MICROALBUMIN/CREAT UR: 24.48 MG/G CR (ref 0–17)
NONHDLC SERPL-MCNC: 183 MG/DL
POTASSIUM SERPL-SCNC: 3.8 MMOL/L (ref 3.4–5.3)
PROT SERPL-MCNC: 7.6 G/DL (ref 6.8–8.8)
SODIUM SERPL-SCNC: 138 MMOL/L (ref 133–144)
TRIGL SERPL-MCNC: 247 MG/DL
TSH SERPL DL<=0.005 MIU/L-ACNC: 1.74 MU/L (ref 0.4–4)

## 2017-04-09 PROBLEM — I10 HYPERTENSION GOAL BP (BLOOD PRESSURE) < 140/90: Status: ACTIVE | Noted: 2017-04-09

## 2017-04-09 PROBLEM — E66.3 OVERWEIGHT (BMI 25.0-29.9): Status: ACTIVE | Noted: 2017-04-09

## 2017-11-27 ENCOUNTER — TELEPHONE (OUTPATIENT)
Dept: FAMILY MEDICINE | Facility: CLINIC | Age: 52
End: 2017-11-27

## 2017-11-27 NOTE — LETTER
December 5, 2017          Samson Camarillo  [address] [address]            Dear Samson Camarillo,      At [facility] we care about your health and are committed to providing quality patient care. Regular appointments are a vital part of the care and management of your health and can help prevent many of the complications that can occur.      It has come to our attention that you are due for Diabetes check.  Please call [facility] at [phone] soon to schedule your follow up appointment.    If you have transferred care to another clinic please call to inform us so that we do not continue to send you reminder letters.      Sincerely,      [facility] Care Team/lola

## 2017-11-27 NOTE — TELEPHONE ENCOUNTER
Panel Management Review      Lab Results   Component Value Date    A1C 11.4 04/04/2017    A1C 11.5 12/28/2015     Last Office Visit with this department: Visit date not found    Fail List measure: DM      Patient is due/failing the following:   A1C    Action needed:   Patient needs office visit for Diabetes check.    Type of outreach:    Phone, spoke to patient.   , left message to call.  Needs a diabetes check.    Questions for provider review:    None                                                                                   Elizabeth Santiago CMA

## 2018-04-18 DIAGNOSIS — I10 HYPERTENSION GOAL BP (BLOOD PRESSURE) < 140/90: ICD-10-CM

## 2018-04-18 DIAGNOSIS — E78.5 HYPERLIPIDEMIA LDL GOAL <100: ICD-10-CM

## 2018-04-18 NOTE — TELEPHONE ENCOUNTER
"Requested Prescriptions   Pending Prescriptions Disp Refills     glipiZIDE (GLUCOTROL) 5 MG tablet [Pharmacy Med Name: GLIPIZIDE 5MG TABLETS]    Last Written Prescription Date:  1/15/18  Last Fill Quantity: 60,  # refills: 0   [place] [facility] [facility] or [facility] prescribing provider:  4/4/17   Future Office Visit:      60 tablet 0     Sig: TAKE 1 TABLET BY MOUTH TWICE DAILY WITH MEALS WITH METFORMIN    Sulfonylurea Agents Failed    4/18/2018  3:03 PM       Failed - Blood pressure less than 140/90 in past 6 months    BP Readings from Last 3 Encounters:   04/04/17 135/87   02/08/16 118/80   01/14/16 139/89                Failed - Patient has documented LDL within the past 12 mos.    Recent Labs   Lab Test  04/04/17   1801   LDL  134*            Failed - Patient has had a Microalbumin in the past 12 mos.    Recent Labs   Lab Test  04/04/17   1801   MICROL  9   UMALCR  24.48*            Failed - Patient has documented A1c within the specified period of time.    Recent Labs   Lab Test  04/04/17   1801   A1C  11.4*            Failed - Patient has a recent creatinine (normal) within the past 12 mos.    Recent Labs   Lab Test  04/04/17   1801   CR  0.84            Failed - Recent (6 mo) or future (30 days) visit within the authorizing provider's specialty    Patient had office visit in the last 6 months or has a visit in the next 30 days with authorizing provider or within the authorizing provider's specialty.  See \"Patient Info\" tab in inbasket, or \"Choose Columns\" in Meds & Orders section of the refill encounter.           Passed - Patient is age 18 or older        lisinopril (PRINIVIL/ZESTRIL) 20 MG tablet [Pharmacy Med Name: LISINOPRIL 20MG TABLETS]    Last Written Prescription Date:  4/4/17  Last Fill Quantity: 90,  # refills: 3   Last Office Visit with [facility] or [facility] prescribing provider:  4/4/17   Future Office Visit:      90 tablet 0     Sig: TAKE 1 TABLET(20 MG) BY MOUTH DAILY    ACE Inhibitors " "(Including Combos) Protocol Failed    4/18/2018  3:03 PM       Failed - Blood pressure under 140/90 in past 12 months    BP Readings from Last 3 Encounters:   04/04/17 135/87   02/08/16 118/80   01/14/16 139/89                Failed - Recent (12 mo) or future (30 days) visit within the authorizing provider's specialty    Patient had office visit in the last 12 months or has a visit in the next 30 days with authorizing provider or within the authorizing provider's specialty.  See \"Patient Info\" tab in inbasket, or \"Choose Columns\" in Meds & Orders section of the refill encounter.           Failed - Normal serum creatinine on file in past 12 months    Recent Labs   Lab Test  04/04/17   1801   CR  0.84            Failed - Normal serum potassium on file in past 12 months    Recent Labs   Lab Test  04/04/17   1801   POTASSIUM  3.8            Passed - Patient is age 18 or older        atorvastatin (LIPITOR) 40 MG tablet [Pharmacy Med Name: ATORVASTATIN 40MG TABLETS]    Last Written Prescription Date:  4/4/17  Last Fill Quantity: 90,  # refills: 3   Last [place] Visit with [facility] [facility] or [facility] prescribing provider:  4/4/17   Future Office Visit:      90 tablet 0     Sig: TAKE 1 TABLET BY MOUTH ONCE DAILY    Statins Protocol Failed    4/18/2018  3:03 PM       Failed - LDL on file in past 12 months    Recent Labs   Lab Test  04/04/17   1801   LDL  134*            Failed - Recent (12 mo) or future (30 days) visit within the authorizing provider's specialty    Patient had office visit in the last 12 months or has a visit in the next 30 days with authorizing provider or within the authorizing provider's specialty.  See \"Patient Info\" tab in inbasket, or \"Choose Columns\" in Meds & Orders section of the refill encounter.           Passed - No abnormal creatine kinase in past 12 months    No lab results found.            Passed - Patient is age 18 or older        metFORMIN (GLUCOPHAGE) 1000 MG tablet [Pharmacy Med Name: METFORMIN " "1000MG TABLETS]    Last Written Prescription Date: 2/27/18  Last Fill Quantity: 60,  # refills: 0   Last [place] Visit [place] [facility] [facility] or [facility] prescribing provider:  4/4/17   Future Office Visit:      60 tablet 0     Sig: TAKE ONE TABLET BY MOUTH TWICE DAILY WITH MEALS    Biguanide Agents Failed    4/18/2018  3:03 PM       Failed - Blood pressure less than 140/90 in past 6 months    BP Readings from Last 3 Encounters:   04/04/17 135/87   02/08/16 118/80   01/14/16 139/89                Failed - Patient has documented LDL within the past 12 mos.    Recent Labs   Lab Test  04/04/17   1801   LDL  134*            Failed - Patient has had a Microalbumin in the past 12 mos.    Recent Labs   Lab Test  04/04/17   1801   MICROL  9   UMALCR  24.48*            Failed - Patient has documented A1c within the specified period of time.    Recent Labs   Lab Test  04/04/17   1801   A1C  11.4*            Failed - Recent (6 mo) or future (30 days) visit within the authorizing provider's specialty    Patient had office visit in the last 6 months or has a visit in the next 30 days with authorizing provider or within the authorizing provider's specialty.  See \"Patient Info\" tab in inbasket, or \"Choose Columns\" in Meds & Orders section of the refill encounter.           Passed - Patient is age 10 or older       Passed - Patient's CR is NOT>1.4 OR Patient's EGFR is NOT<45 within past 12 mos.    Recent Labs   Lab Test  04/04/17   1801   GFRESTIMATED  >90  Non  GFR Calc     GFRESTBLACK  >90   GFR Calc         Recent Labs   Lab Test  04/04/17   1801   CR  0.84            Passed - Patient does NOT have a diagnosis of CHF.              Titi Faarax  [facility]  "

## 2018-04-19 NOTE — TELEPHONE ENCOUNTER
Patient was given ariadna refills but was never informed by our staff to schedule appointment.   Patient needs office visit and fasting labs for future refills. Please schedule patient.  Once appointment made, route to provider to address refill.   Cee Levi RN

## 2018-04-20 NOTE — TELEPHONE ENCOUNTER
This writer attempted to contact Patient on 04/20/18      Reason for call Patient to schedule an office visit with fasting labs and left voicemail message.      If patient calls back:   Schedule Office Visit appointment within 2 weeks with PCP, document that pt called and close encounter     Postponing message.    Millie Birmingham MA

## 2018-04-20 NOTE — TELEPHONE ENCOUNTER
Patient  returned call    Best number to reach caller: Home number on file [phone] (home)    Is it ok to leave a detailed message: Not Applicable   *FYI, Patient is scheduled;

## 2018-04-23 NOTE — TELEPHONE ENCOUNTER
Patient is scheduled for an appointment on 4/26/18, please address medication for pt.  Aryan Robertson,  [facility]

## 2018-04-24 RX ORDER — GLIPIZIDE 5 MG/1
TABLET ORAL
Qty: 60 TABLET | Refills: 0 | Status: SHIPPED | OUTPATIENT
Start: 2018-04-24 | End: 2018-04-26

## 2018-04-24 RX ORDER — ATORVASTATIN CALCIUM 40 MG/1
TABLET, FILM COATED ORAL
Qty: 30 TABLET | Refills: 0 | Status: SHIPPED | OUTPATIENT
Start: 2018-04-24 | End: 2018-04-26

## 2018-04-24 RX ORDER — LISINOPRIL 20 MG/1
TABLET ORAL
Qty: 30 TABLET | Refills: 0 | Status: SHIPPED | OUTPATIENT
Start: 2018-04-24 | End: 2018-04-26

## 2018-04-26 ENCOUNTER — OFFICE VISIT (OUTPATIENT)
Dept: FAMILY MEDICINE | Facility: CLINIC | Age: 53
End: 2018-04-26
Payer: COMMERCIAL

## 2018-04-26 VITALS
HEART RATE: 79 BPM | TEMPERATURE: 98.1 F | OXYGEN SATURATION: 98 % | BODY MASS INDEX: 27.77 KG/M2 | WEIGHT: 194 LBS | SYSTOLIC BLOOD PRESSURE: 135 MMHG | DIASTOLIC BLOOD PRESSURE: 86 MMHG | HEIGHT: 70 IN

## 2018-04-26 DIAGNOSIS — I10 HYPERTENSION GOAL BP (BLOOD PRESSURE) < 140/90: ICD-10-CM

## 2018-04-26 DIAGNOSIS — N52.8 OTHER MALE ERECTILE DYSFUNCTION: ICD-10-CM

## 2018-04-26 DIAGNOSIS — Z13.89 SCREENING FOR DIABETIC PERIPHERAL NEUROPATHY: ICD-10-CM

## 2018-04-26 DIAGNOSIS — B35.1 ONYCHOMYCOSIS: ICD-10-CM
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
## 2018-04-26 DIAGNOSIS — E78.5 HYPERLIPIDEMIA LDL GOAL <100: ICD-10-CM

## 2018-04-26 LAB
ALBUMIN SERPL-MCNC: 4.1 G/DL (ref 3.4–5)
ALP SERPL-CCNC: 82 U/L (ref 40–150)
ALT SERPL W P-5'-P-CCNC: 33 U/L (ref 0–70)
ANION GAP SERPL CALCULATED.3IONS-SCNC: 6 MMOL/L (ref 3–14)
AST SERPL W P-5'-P-CCNC: 15 U/L (ref 0–45)
BILIRUB SERPL-MCNC: 0.8 MG/DL (ref 0.2–1.3)
BUN SERPL-MCNC: 13 MG/DL (ref 7–30)
CALCIUM SERPL-MCNC: 9.3 MG/DL (ref 8.5–10.1)
CHLORIDE SERPL-SCNC: 105 MMOL/L (ref 94–109)
CHOLEST SERPL-MCNC: 126 MG/DL
CO2 SERPL-SCNC: 28 MMOL/L (ref 20–32)
CREAT SERPL-MCNC: 0.87 MG/DL (ref 0.66–1.25)
GFR SERPL CREATININE-BSD FRML MDRD: >90 ML/MIN/1.7M2
GLUCOSE BLD-MCNC: 148 MG/DL (ref 70–99)
GLUCOSE SERPL-MCNC: 152 MG/DL (ref 70–99)
HBA1C MFR BLD: 9.5 % (ref 0–5.6)
HDLC SERPL-MCNC: 33 MG/DL
LDLC SERPL CALC-MCNC: 66 MG/DL
NONHDLC SERPL-MCNC: 93 MG/DL
POTASSIUM SERPL-SCNC: 3.8 MMOL/L (ref 3.4–5.3)
PROT SERPL-MCNC: 7.1 G/DL (ref 6.8–8.8)
SODIUM SERPL-SCNC: 139 MMOL/L (ref 133–144)
TRIGL SERPL-MCNC: 137 MG/DL

## 2018-04-26 PROCEDURE — 82947 ASSAY GLUCOSE BLOOD QUANT: CPT | Performed by: INTERNAL MEDICINE

## 2018-04-26 PROCEDURE — 82043 UR ALBUMIN QUANTITATIVE: CPT | Performed by: INTERNAL MEDICINE

## 2018-04-26 PROCEDURE — 80061 LIPID PANEL: CPT | Performed by: INTERNAL MEDICINE

## 2018-04-26 PROCEDURE — 80053 COMPREHEN METABOLIC PANEL: CPT | Performed by: INTERNAL MEDICINE

## 2018-04-26 PROCEDURE — 99214 OFFICE O/P EST MOD 30 MIN: CPT | Performed by: INTERNAL MEDICINE

## 2018-04-26 PROCEDURE — 83036 HEMOGLOBIN GLYCOSYLATED A1C: CPT | Performed by: INTERNAL MEDICINE

## 2018-04-26 PROCEDURE — 36415 COLL VENOUS BLD VENIPUNCTURE: CPT | Performed by: INTERNAL MEDICINE

## 2018-04-26 RX ORDER — TERBINAFINE HYDROCHLORIDE 250 MG/1
250 TABLET ORAL DAILY
Qty: 90 TABLET | Refills: 1 | Status: SHIPPED | OUTPATIENT
Start: 2018-04-26 | End: 2019-04-15

## 2018-04-26 RX ORDER — LISINOPRIL 20 MG/1
TABLET ORAL
Qty: 90 TABLET | Refills: 3 | Status: SHIPPED | OUTPATIENT
Start: 2018-04-26 | End: 2019-04-17

## 2018-04-26 RX ORDER — GLIPIZIDE 5 MG/1
TABLET ORAL
Qty: 180 TABLET | Refills: 3 | Status: SHIPPED | OUTPATIENT
Start: 2018-04-26 | End: 2019-04-17

## 2018-04-26 RX ORDER — DOXYCYCLINE 100 MG/1
100 CAPSULE ORAL 2 TIMES DAILY
Qty: 28 CAPSULE | Refills: 1 | Status: SHIPPED | OUTPATIENT
Start: 2018-04-26 | End: 2018-05-10

## 2018-04-26 RX ORDER — SILDENAFIL 50 MG/1
50 TABLET, FILM COATED ORAL DAILY PRN
Qty: 12 TABLET | Refills: 1 | Status: SHIPPED | OUTPATIENT
Start: 2018-04-26 | End: 2020-07-14

## 2018-04-26 RX ORDER — ATORVASTATIN CALCIUM 40 MG/1
40 TABLET, FILM COATED ORAL DAILY
Qty: 90 TABLET | Refills: 3 | Status: SHIPPED | OUTPATIENT
Start: 2018-04-26 | End: 2019-04-17

## 2018-04-26 ASSESSMENT — PAIN SCALES - GENERAL: PAINLEVEL: NO PAIN (0)

## 2018-04-26 NOTE — MR AVS SNAPSHOT
After Visit Summary   4/26/2018    Samson Camarillo    MRN: [id number]           Patient Information     Date Of Birth          1965        Visit Information        Provider Department      4/26/2018 4:40 PM Mick Mariano MD [facility]        Today's Diagnoses     Type 2 diabetes mellitus, uncontrolled (H)    -  1    Hyperlipidemia LDL goal <100        Hypertension goal BP (blood pressure) < 140/90        Screening for diabetic peripheral neuropathy        Onychomycosis        Tick bite, initial encounter        Other male erectile dysfunction          Care Instructions    At [facility], we strive to deliver an exceptional experience to you, every time we see you.  If you receive a survey in the mail, please send us back your thoughts. We really do value your feedback.    Based on your medical history, these are the current health maintenance/preventive care services that you are due for (some may have been done at this visit.)  Health Maintenance Due   Topic Date Due     HIV SCREEN (SYSTEM ASSIGNED)  09/07/1981     EYE EXAM Q1 YEAR  06/02/2016     INFLUENZA VACCINE (1) 09/01/2017     A1C Q6 MO  10/04/2017     FOOT EXAM Q1 YEAR  04/04/2018     CREATININE Q1 YEAR  04/04/2018     LIPID MONITORING Q1 YEAR  04/04/2018     MICROALBUMIN Q1 YEAR  04/04/2018         Suggested websites for health information:  Www.[employer].org : Up to date and easily searchable information on multiple topics.  Www.medlineplus.gov : medication info, interactive tutorials, watch real surgeries online  Www.familydoctor.org : good info from the Academy of Family Physicians  Www.cdc.gov : public health info, travel advisories, epidemics (H1N1)  Www.aap.org : children's health info, normal development, vaccinations  Www.health[place] : [place] dept of health, public health issues in [place], N1N1    Your care team:                            Family Medicine Internal Medicine   Johnny  "MD Mick Jones MD Shantel Branch-Fleming, MD Katya Georgiev PA-C Nam Ho, MD Pediatrics   MORIS Mcneill, ANTONI Cedillo APRMD Corazon Cueto CNP, MD Deborah Mielke, MD Kim Thein, APRPark Nicollet [facility]      Clinic hours: Monday - Thursday 7 am-7 pm; Fridays 7 am-5 pm.   Urgent care: Monday - Friday 11 am-9 pm; Saturday and Sunday 9 am-5 pm.  Pharmacy : Monday -Thursday 8 am-8 pm; Friday 8 am-6 pm; Saturday and Sunday 9 am-5 pm.     Clinic: [phone]   Pharmacy: [phone]            Follow-ups after your visit        Who to contact     If you have questions or need follow up information about today's clinic visit or your schedule please contact [facility] directly at [phone].  Normal or non-critical lab and imaging results will be communicated to you by [employer]et[employer], letter or phone within 4 business days after the clinic has received the results. If you do not hear from us within 7 days, please contact the clinic through [employer]et[employer] or phone. If you have a critical or abnormal lab result, we will notify you by phone as soon as possible.  Submit refill requests through [employer] or call your pharmacy and they will forward the refill request to us. Please allow 3 business days for your refill to be completed.          Additional Information About Your Visit        C[employer]ando Information     [employer] lets you send messages to your doctor, view your test results, renew your prescriptions, schedule appointments and more. To sign up, go to [email or web address] . Click on \"Log in\" on the left side of the screen, which will take you to the Welcome page. Then click on \"Sign up Now\" on the right side of the page.     You will be asked to enter the access code listed below, as well as some personal information. Please follow the directions to create your username and password.     Your access code is: [email or web address]  Expires: 7/25/2018  5:47 PM     Your " "access code will  in 90 days. If you need help or a new code, please call your [facility] clinic or [phone].        Care EveryWhere ID     This is your Care EveryWhere ID. This could be used by other organizations to access your [facility] medical records  [id number]        Your Vitals Were     Pulse Temperature Height Pulse Oximetry BMI (Body Mass Index)       79 98.1  F (36.7  C) (Oral) 5' 9.75\" (1.772 m) 98% 28.04 kg/m2        Blood Pressure from Last 3 Encounters:   18 135/86   17 135/87   16 118/80    Weight from Last 3 Encounters:   18 194 lb (88 kg)   17 192 lb (87.1 kg)   16 186 lb (84.4 kg)              We Performed the Following     Albumin Random Urine Quantitative with Creat Ratio     Comprehensive metabolic panel     FOOT EXAM  NO CHARGE [30133.114]     Glucose whole blood     HEMOGLOBIN A1C     Lipid panel reflex to direct LDL Fasting     Testosterone Free and Total          Today's Medication Changes          These changes are accurate as of 18  5:50 PM.  If you have any questions, ask your nurse or doctor.               Start taking these medicines.        Dose/Directions    doxycycline monohydrate 100 MG capsule   Used for:  Tick bite, initial encounter   Started by:  Mick Mariano MD        Dose:  100 mg   Take 1 capsule (100 mg) by mouth 2 times daily for 14 days   Quantity:  28 capsule   Refills:  1       sildenafil 50 MG tablet   Commonly known as:  VIAGRA   Used for:  Other male erectile dysfunction   Started by:  Mick Mariano MD        Dose:  50 mg   Take 1 tablet (50 mg) by mouth daily as needed 30 min to 4 hrs before sex. Do not use with nitroglycerin, terazosin or doxazosin.   Quantity:  12 tablet   Refills:  1       terbinafine 250 MG tablet   Commonly known as:  lamISIL   Used for:  Onychomycosis   Started by:  Mick Mariano MD        Dose:  250 mg   Take 1 tablet (250 mg) by mouth daily   Quantity:  90 tablet "   Refills:  1         These medicines have changed or have updated prescriptions.        Dose/Directions    atorvastatin 40 MG tablet   Commonly known as:  LIPITOR   This may have changed:  See the new instructions.   Used for:  Hyperlipidemia LDL goal <100   Changed by:  Mick Mariano MD        Dose:  40 mg   Take 1 tablet (40 mg) by mouth daily   Quantity:  90 tablet   Refills:  3       glipiZIDE 5 MG tablet   Commonly known as:  GLUCOTROL   This may have changed:  See the new instructions.   Used for:  Type 2 diabetes mellitus, uncontrolled (H)   Changed by:  Mick Mariano MD        TAKE 1 TABLET BY MOUTH TWICE DAILY WITH MEALS WITH METFORMIN   Quantity:  180 tablet   Refills:  3       lisinopril 20 MG tablet   Commonly known as:  PRINIVIL/ZESTRIL   This may have changed:  See the new instructions.   Used for:  Hypertension goal BP (blood pressure) < 140/90   Changed by:  Mick Mariano MD        TAKE 1 TABLET(20 MG) BY MOUTH DAILY   Quantity:  90 tablet   Refills:  3       metFORMIN 1000 MG tablet   Commonly known as:  GLUCOPHAGE   This may have changed:  See the new instructions.   Used for:  Type 2 diabetes mellitus, uncontrolled (H)   Changed by:  Mick Mariano MD        TAKE ONE TABLET BY MOUTH TWICE DAILY WITH MEALS   Quantity:  180 tablet   Refills:  1            Where to get your medicines      These medications were sent to [facility] [address] [place] [address] [facility]  [address] [facility] [address]    Hours:  24-hours Phone:  [phone]     atorvastatin 40 MG tablet    glipiZIDE 5 MG tablet    lisinopril 20 MG tablet    metFORMIN 1000 MG tablet    sildenafil 50 MG tablet    terbinafine 250 MG tablet         Some of these will need a paper prescription and others can be bought over the counter.  Ask your nurse if you have questions.     Bring a paper prescription for each of these medications      doxycycline monohydrate 100 MG capsule                Primary Care Provider Office Phone # Fax #    Mick Mariano [phone] [address]  [facility] [address]        Equal Access to Services     CHARLES LEVY : Hadii dashawn ku hadcaritoo Soomaali, waaxda luqadaha, qaybta kaalmada adeegyada, waxmaame alessandrain hayaakristina marte pino marina gong. So [facility] [phone].    ATENCIÓN: Si habla español, tiene a landeros disposición servicios gratuitos de asistencia lingüística. Llame al [phone].    We comply with applicable federal civil rights laws and [place] laws. We do not discriminate on the basis of race, color, national origin, age, disability, sex, sexual orientation, or gender identity.            Thank you!     Thank you for choosing [facility]  for your care. Our goal is always to provide you with excellent care. Hearing back from our patients is one way we can continue to improve our services. Please take a few minutes to complete the written survey that you may receive in the mail after your visit with us. Thank you!             Your Updated Medication List - Protect others around you: Learn how to safely use, store and throw away your medicines at www.disposemymeds.org.          This list is accurate as of 4/26/18  5:50 PM.  Always use your most recent med list.                   Brand Name Dispense Instructions for use Diagnosis    aspirin 81 MG tablet      Take by mouth daily (with dinner)        atorvastatin 40 MG tablet    LIPITOR    90 tablet    Take 1 tablet (40 mg) by mouth daily    Hyperlipidemia LDL goal <100       SONA CONTOUR test strip   Generic drug:  blood glucose monitoring     200 each    TEST TWICE DAILY    Type 2 diabetes mellitus, uncontrolled (H)       doxycycline monohydrate 100 MG capsule     28 capsule    Take 1 capsule (100 mg) by mouth 2 times daily for 14 days    Tick bite, initial encounter       glipiZIDE 5 MG tablet    GLUCOTROL    180  tablet    TAKE 1 TABLET BY MOUTH TWICE DAILY WITH MEALS WITH METFORMIN    Type 2 diabetes mellitus, uncontrolled (H)       lisinopril 20 MG tablet    PRINIVIL/ZESTRIL    90 tablet    TAKE 1 TABLET(20 MG) BY MOUTH DAILY    Hypertension goal BP (blood pressure) < 140/90       metFORMIN 1000 MG tablet    GLUCOPHAGE    180 tablet    TAKE ONE TABLET BY MOUTH TWICE DAILY WITH MEALS    Type 2 diabetes mellitus, uncontrolled (H)       order for DME     1 each    Equipment being ordered:  Glucose monitor and test strips to match . Must test twice a day Dispense one kit and 3 months of test strips with 4 refills.    Diabetes mellitus, type 2 (H)       sildenafil 50 MG tablet    VIAGRA    12 tablet    Take 1 tablet (50 mg) by mouth daily as needed 30 min to 4 hrs before sex. Do not use with nitroglycerin, terazosin or doxazosin.    Other male erectile dysfunction       terbinafine 250 MG tablet    lamISIL    90 tablet    Take 1 tablet (250 mg) by mouth daily    Onychomycosis

## 2018-04-26 NOTE — PATIENT INSTRUCTIONS
At [facility], we strive to deliver an exceptional experience to you, every time we see you.  If you receive a survey in the mail, please send us back your thoughts. We really do value your feedback.    Based on your medical history, these are the current health maintenance/preventive care services that you are due for (some may have been done at this visit.)  Health Maintenance Due   Topic Date Due     HIV SCREEN (SYSTEM ASSIGNED)  09/07/1981     EYE EXAM Q1 YEAR  06/02/2016     INFLUENZA VACCINE (1) 09/01/2017     A1C Q6 MO  10/04/2017     FOOT EXAM Q1 YEAR  04/04/2018     CREATININE Q1 YEAR  04/04/2018     LIPID MONITORING Q1 YEAR  04/04/2018     MICROALBUMIN Q1 YEAR  04/04/2018         Suggested websites for health information:  Www.[employer].Deepclass : Up to date and easily searchable information on multiple topics.  Www.[employer].gov : medication info, interactive tutorials, watch real surgeries online  Www.familydoctor.org : good info from the Academy of Family Physicians  Www.cdc.gov : public health info, travel advisories, epidemics (H1N1)  Www.aap.org : children's health info, normal development, vaccinations  Www.health.[facility].[place].us : [place] dept of health, public health issues in [place], N1N1    Your care team:                            Family Medicine Internal Medicine   MD Mick Payan MD Shantel Branch-Fleming, MD Katya Georgiev PA-C Nam Ho, MD Pediatrics   MORIS Mcneill, MD Corazon Dorantes CNP, MD Deborah Mielke, MD Kim Thein, APRN CNP      Clinic hours: Monday - Thursday 7 am-7 pm; Fridays 7 am-5 pm.   Urgent care: Monday - Friday 11 am-9 pm; Saturday and Sunday 9 am-5 pm.  Pharmacy : Monday -Thursday 8 am-8 pm; Friday 8 am-6 pm; Saturday and Sunday 9 am-5 pm.     Clinic: [phone]   Pharmacy: [phone]

## 2018-04-26 NOTE — PROGRESS NOTES
SUBJECTIVE:   Samson Camarillo is a 52 year old male who presents to clinic today for the following health issues:    Diabetes Follow-up      Patient is checking blood sugars: not at all    Diabetic concerns: None     Symptoms of hypoglycemia (low blood sugar): none     Paresthesias (numbness or burning in feet) or sores: No     Date of last diabetic eye exam: 8 months ago    Hyperlipidemia Follow-Up      Rate your low fat/cholesterol diet?: good    Taking statin?  Yes, no muscle aches from statin    Other lipid medications/supplements?:  Multiple vitamin, vitamin for skin, hair and nail and vitamin b    Hypertension Follow-up      Outpatient blood pressures are being checked at work.  Results are unknown.    Low Salt Diet: no added salt    BP Readings from Last 2 Encounters:   04/04/17 135/87   02/08/16 118/80     Hemoglobin A1C (%)   Date Value   04/04/2017 11.4 (H)   12/28/2015 11.5 (H)     LDL Cholesterol Calculated (mg/dL)   Date Value   04/04/2017 134 (H)   01/16/2015 128       Amount of exercise or physical activity: None    Problems taking medications regularly: No    Medication side effects: none    Diet: regular (no restrictions)        Problem list and histories reviewed & adjusted, as indicated.  Additional history: as documented    Patient Active Problem List   Diagnosis     Chemical dependency (H)     Anxiety     Psoriasis     HYPERLIPIDEMIA LDL GOAL <100     Erectile dysfunction     Type 2 diabetes mellitus, uncontrolled (H)     Hypertension goal BP (blood pressure) < 140/90     Overweight (BMI 25.0-29.9)     Uncontrolled type 2 diabetes with neuropathy (H)     Past Surgical History:   Procedure Laterality Date     C AFF PREP FACE/ORAL PROST UNLISTED       COLONOSCOPY N/A 6/15/2015    Procedure: COLONOSCOPY;  Surgeon: Jonnie Dwyer MD;  Location: [facility]     COLONOSCOPY WITH CO2 INSUFFLATION N/A 6/15/2015    Procedure: COLONOSCOPY WITH CO2 INSUFFLATION;  Surgeon: Jonnie Dwyer MD;   Location: [facility] OR     ECHO STRESS TEST  2010     ENDOSCOPIC SINUS SURGERY  2/2015    right maxillary sinus     SINUS SURGERY Bilateral 2-18-16    Dr. Quesada     SINUS SURGERY  01/2016       Social History   Substance Use Topics     Smoking status: Current Every Day Smoker     Types: Cigarettes     Smokeless tobacco: Never Used      Comment: 1 pack per week     Alcohol use 0.0 oz/week     0 Standard drinks or equivalent per week      Comment: 1 drink per month     Family History   Problem Relation Age of Onset     Hypertension Mother      HEART DISEASE Mother      Thyroid Disease Mother      KIDNEY DISEASE Mother      stage 2 to 3     DIABETES Father      Hypertension Father      CEREBROVASCULAR DISEASE Father      DIABETES Maternal Grandfather      CANCER Paternal Grandmother      HEART DISEASE Paternal Grandfather          Allergies   Allergen Reactions     Nkda [No Known Drug Allergies]      Recent Labs   Lab Test  04/26/18   1717  04/04/17   1801  12/28/15   1100   04/23/15   1239  01/16/15   1113   A1C  9.5*  11.4*  11.5*   < >  12.2*   --    LDL  66  134*   --    --    --   128   HDL  33*  38*   --    --    --   36*   TRIG  137  247*   --    --    --   192*   ALT  33  44   --    --    --    --    CR  0.87  0.84   --    < >   --   0.77   GFRESTIMATED  >90  >90  Non African American GFR Calc     --    < >   --   >90  Non  GFR Calc     GFRESTBLACK  >90  >90  African American GFR Calc     --    < >   --   >90   GFR Calc     POTASSIUM  3.8  3.8   --    < >   --   3.9   TSH   --   1.74   --    --   1.99   --     < > = values in this interval not displayed.      BP Readings from Last 3 Encounters:   04/26/18 135/86   04/04/17 135/87   02/08/16 118/80    Wt Readings from Last 3 Encounters:   04/26/18 194 lb (88 kg)   04/04/17 192 lb (87.1 kg)   03/16/16 186 lb (84.4 kg)              ROS:  CONSTITUTIONAL: NEGATIVE for fever, chills, change in weight  INTEGUMENTARY/SKIN: NEGATIVE for worrisome  "rashes, moles or lesions  EYES: NEGATIVE for vision changes or irritation  ENT/MOUTH: NEGATIVE for ear, mouth and throat problems  RESP: NEGATIVE for significant cough or SOB  CV: NEGATIVE for chest pain, palpitations or peripheral edema  GI: NEGATIVE for nausea, abdominal pain, heartburn, or change in bowel habits  : NEGATIVE for frequency, dysuria, or hematuria  MUSCULOSKELETAL: NEGATIVE for significant arthralgias or myalgia  NEURO: NEGATIVE for weakness, dizziness or paresthesias  ENDOCRINE: NEGATIVE for temperature intolerance, skin/hair changes  HEME: NEGATIVE for bleeding problems  PSYCHIATRIC: NEGATIVE for changes in mood or affect    OBJECTIVE:     /86  Pulse 79  Temp 98.1  F (36.7  C) (Oral)  Ht 5' 9.75\" (1.772 m)  Wt 194 lb (88 kg)  SpO2 98%  BMI 28.04 kg/m2  Body mass index is 28.04 kg/(m^2).  GENERAL: healthy, alert and no distress  EYES: Eyes grossly normal to inspection, PERRL and conjunctivae and sclerae normal  HENT: ear canals and TM's normal, nose and mouth without ulcers or lesions  NECK: no adenopathy, no asymmetry, masses, or scars and thyroid normal to palpation  RESP: lungs clear to auscultation - no rales, rhonchi or wheezes  CV: regular rate and rhythm, normal S1 S2, no S3 or S4, no murmur, click or rub, no peripheral edema and peripheral pulses strong  ABDOMEN: soft, nontender, no hepatosplenomegaly, no masses and bowel sounds normal  MS: no gross musculoskeletal defects noted, no edema  SKIN: no suspicious lesions or rashes  NEURO: Normal strength and tone, mentation intact and speech normal  PSYCH: mentation appears normal, affect normal/bright    Diagnostic Test Results:  Results for orders placed or performed in visit on 04/26/18   HEMOGLOBIN A1C   Result Value Ref Range    Hemoglobin A1C 9.5 (H) 0 - 5.6 %   Lipid panel reflex to direct LDL Fasting   Result Value Ref Range    Cholesterol 126 <200 mg/dL    Triglycerides 137 <150 mg/dL    HDL Cholesterol 33 (L) >39 mg/dL    " LDL Cholesterol Calculated 66 <100 mg/dL    Non HDL Cholesterol 93 <130 mg/dL   Comprehensive metabolic panel   Result Value Ref Range    Sodium 139 133 - 144 mmol/L    Potassium 3.8 3.4 - 5.3 mmol/L    Chloride 105 94 - 109 mmol/L    Carbon Dioxide 28 20 - 32 mmol/L    Anion Gap 6 3 - 14 mmol/L    Glucose 152 (H) 70 - 99 mg/dL    Urea Nitrogen 13 7 - 30 mg/dL    Creatinine 0.87 0.66 - 1.25 mg/dL    GFR Estimate >90 >60 mL/min/1.7m2    GFR Estimate If Black >90 >60 mL/min/1.7m2    Calcium 9.3 8.5 - 10.1 mg/dL    Bilirubin Total 0.8 0.2 - 1.3 mg/dL    Albumin 4.1 3.4 - 5.0 g/dL    Protein Total 7.1 6.8 - 8.8 g/dL    Alkaline Phosphatase 82 40 - 150 U/L    ALT 33 0 - 70 U/L    AST 15 0 - 45 U/L   Glucose whole blood   Result Value Ref Range    Glucose Whole Blood 148 (H) 70 - 99 mg/dL       ASSESSMENT/PLAN:     (E11.65) Type 2 diabetes mellitus, uncontrolled (H)  (primary encounter diagnosis)  Comment:   Plan: HEMOGLOBIN A1C, Albumin Random Urine         Quantitative with Creat Ratio, Comprehensive         metabolic panel, Glucose whole blood, metFORMIN        (GLUCOPHAGE) 1000 MG tablet, glipiZIDE         (GLUCOTROL) 5 MG tablet            (E78.5) Hyperlipidemia LDL goal <100  Comment:   Plan: Lipid panel reflex to direct LDL Fasting,         atorvastatin (LIPITOR) 40 MG tablet            (I10) Hypertension goal BP (blood pressure) < 140/90  Comment:   Plan: lisinopril (PRINIVIL/ZESTRIL) 20 MG tablet            (Z13.89) Screening for diabetic peripheral neuropathy  Comment:   Plan: FOOT EXAM  NO CHARGE [27201.114]            (B35.1) Onychomycosis  Comment:   Plan: terbinafine (LAMISIL) 250 MG tablet            (W57.XXXA) Tick bite, initial encounter  Comment:   Plan: doxycycline monohydrate 100 MG capsule            (N52.8) Other male erectile dysfunction  Comment:   Plan: sildenafil (VIAGRA) 50 MG tablet, Testosterone         Free and Total, CANCELED: Testosterone Free and        Total            Follow-up visit if  condition worsens.      Mick Mariano MD  [facility]

## 2018-04-27 LAB
CREAT UR-MCNC: 46 MG/DL
MICROALBUMIN UR-MCNC: <5 MG/L
MICROALBUMIN/CREAT UR: NORMAL MG/G CR (ref 0–17)

## 2019-04-15 ENCOUNTER — OFFICE VISIT (OUTPATIENT)
Dept: FAMILY MEDICINE | Facility: CLINIC | Age: 54
End: 2019-04-15
Payer: COMMERCIAL

## 2019-04-15 VITALS
SYSTOLIC BLOOD PRESSURE: 119 MMHG | WEIGHT: 185 LBS | RESPIRATION RATE: 24 BRPM | BODY MASS INDEX: 27.4 KG/M2 | HEIGHT: 69 IN | HEART RATE: 79 BPM | TEMPERATURE: 98.2 F | DIASTOLIC BLOOD PRESSURE: 75 MMHG | OXYGEN SATURATION: 98 %

## 2019-04-15 DIAGNOSIS — E11.65 UNCONTROLLED TYPE 2 DIABETES MELLITUS WITH HYPERGLYCEMIA (H): ICD-10-CM

## 2019-04-15 DIAGNOSIS — I10 HYPERTENSION GOAL BP (BLOOD PRESSURE) < 140/90: ICD-10-CM

## 2019-04-15 DIAGNOSIS — R05.9 COUGH: Primary | ICD-10-CM

## 2019-04-15 DIAGNOSIS — E78.5 HYPERLIPIDEMIA LDL GOAL <100: ICD-10-CM

## 2019-04-15 LAB
ERYTHROCYTE [DISTWIDTH] IN BLOOD BY AUTOMATED COUNT: 12.9 % (ref 10–15)
HBA1C MFR BLD: 9 % (ref 0–5.6)
HCT VFR BLD AUTO: 37.1 % (ref 40–53)
HGB BLD-MCNC: 13.4 G/DL (ref 13.3–17.7)
MCH RBC QN AUTO: 30.5 PG (ref 26.5–33)
MCHC RBC AUTO-ENTMCNC: 36.1 G/DL (ref 31.5–36.5)
MCV RBC AUTO: 84 FL (ref 78–100)
PLATELET # BLD AUTO: 213 10E9/L (ref 150–450)
RBC # BLD AUTO: 4.4 10E12/L (ref 4.4–5.9)
WBC # BLD AUTO: 6.4 10E9/L (ref 4–11)

## 2019-04-15 PROCEDURE — 80053 COMPREHEN METABOLIC PANEL: CPT | Performed by: FAMILY MEDICINE

## 2019-04-15 PROCEDURE — 84443 ASSAY THYROID STIM HORMONE: CPT | Performed by: FAMILY MEDICINE

## 2019-04-15 PROCEDURE — 82043 UR ALBUMIN QUANTITATIVE: CPT | Performed by: FAMILY MEDICINE

## 2019-04-15 PROCEDURE — 83036 HEMOGLOBIN GLYCOSYLATED A1C: CPT | Performed by: FAMILY MEDICINE

## 2019-04-15 PROCEDURE — 85027 COMPLETE CBC AUTOMATED: CPT | Performed by: FAMILY MEDICINE

## 2019-04-15 PROCEDURE — 36415 COLL VENOUS BLD VENIPUNCTURE: CPT | Performed by: FAMILY MEDICINE

## 2019-04-15 PROCEDURE — 99214 OFFICE O/P EST MOD 30 MIN: CPT | Performed by: FAMILY MEDICINE

## 2019-04-15 ASSESSMENT — MIFFLIN-ST. JEOR: SCORE: 1664.53

## 2019-04-15 NOTE — LETTER
[address]  [address]  [phone]    April 18, 2019      Samson Camarillo  [address] [place] [id number]      Dear Samson,        Attached you will find a copy of your recent laboratory report.   Your urine testing shows elevated protein.  This is likely related to the diabetes.  As your blood sugar comes under better control this level may normalize.   Your thyroid testing is normal.   Kidney testing is normal.   Your liver testing shows one borderline abnormal result.  This may be related to your recent illness.   Your blood cell counts are normal.   Your long term blood sugar testing is improved from previous but remains elevated above ideal goal of less than 7%.  I would recommend you continue the metformin and glipizide and begin Jardiance as an additional medication to better control your blood sugars.     Your other medication is recommended as previous.   We will be contacting you on the phone to discuss this.   Please call or MyChart message me if you have any questions.       Sincerely,         Keturah Lyn MD

## 2019-04-15 NOTE — PROGRESS NOTES
SUBJECTIVE:   Samson Camarillo is a 53 year old male who presents to clinic today for the following   health issues:      URGENT CARE  F/U    Acute illness concerns? Cough   Onset: 4/10/19 - Patient was seen at [facility]; not getting any better. Treated with Zpak.     Symptoms: He voices still having non-productive cough; not much mucus. He was tested negative for flu and strep. Also had chest xray done.  At night cough, dizziness - no vertigo.  Overheated - chills.   Urgent Care Note: Flu neg. Strep neg. Will tx with zithromax. Out of window for tamiflu. Discussed supportive care, and reasons for immediate and non-urgent follow up.   XRAY Findings: The cardiac silhouette and pulmonary vasculature are within normal limits. The lungs are clear without evidence of consolidation or effusion. No significant osseous abnormality.        Diabetes Follow-up      Patient is checking blood sugars: not at all    Diabetic concerns: None     Symptoms of hypoglycemia (low blood sugar): none     Paresthesias (numbness or burning in feet) or sores: Yes occasional numbness in bottom of feet.     Date of last diabetic eye exam: due.    BP Readings from Last 2 Encounters:   04/15/19 119/75   04/26/18 135/86     Hemoglobin A1C (%)   Date Value   04/15/2019 9.0 (H)   04/26/2018 9.5 (H)     LDL Cholesterol Calculated (mg/dL)   Date Value   04/26/2018 66   04/04/2017 134 (H)       Diabetes Management Resources    Additional history: as documented    Reviewed  and updated as needed this visit by clinical staff  Tobacco  Allergies  Meds  Med Hx  Surg Hx  Fam Hx  Soc Hx        Reviewed and updated as needed this visit by Provider  Tobacco  Allergies  Meds  Med Hx  Surg Hx  Fam Hx  Soc Hx        BP Readings from Last 3 Encounters:   04/15/19 119/75   04/26/18 135/86   04/04/17 135/87    Wt Readings from Last 3 Encounters:   04/15/19 83.9 kg (185 lb)   04/26/18 88 kg (194 lb)   04/04/17 87.1 kg (192 lb)                 "    ROS:  Constitutional, HEENT, cardiovascular, pulmonary, gi and gu systems are negative, except as otherwise noted.    OBJECTIVE:     /75 (BP Location: Right arm)   Pulse 79   Temp 98.2  F (36.8  C) (Oral)   Resp 24   Ht 1.753 m (5' 9\")   Wt 83.9 kg (185 lb)   SpO2 98%   BMI 27.32 kg/m    Body mass index is 27.32 kg/m .  GENERAL: healthy, alert and no distress  HENT: normal cephalic/atraumatic, ear canals and TM's normal, nose and mouth without ulcers or lesions, nasal mucosa edematous , rhinorrhea clear, thick and postnasal, oropharynx clear and oral mucous membranes moist  NECK: no adenopathy, no asymmetry, masses, or scars and thyroid normal to palpation  RESP: lungs clear to auscultation - no rales, rhonchi or wheezes  CV: regular rate and rhythm, normal S1 S2, no S3 or S4, no murmur, click or rub, no peripheral edema and peripheral pulses strong  ABDOMEN: soft, nontender, no hepatosplenomegaly, no masses and bowel sounds normal  MS: no gross musculoskeletal defects noted, no edema  NEURO: Normal strength and tone, mentation intact and speech normal  BACK: no CVA tenderness, no paralumbar tenderness    Diagnostic Test Results:  Results for orders placed or performed in visit on 04/15/19   HEMOGLOBIN A1C   Result Value Ref Range    Hemoglobin A1C 9.0 (H) 0 - 5.6 %   Albumin Random Urine Quantitative with Creat Ratio   Result Value Ref Range    Creatinine Urine 318 mg/dL    Albumin Urine mg/L 58 mg/L    Albumin Urine mg/g Cr 18.27 (H) 0 - 17 mg/g Cr   TSH WITH FREE T4 REFLEX   Result Value Ref Range    TSH 1.67 0.40 - 4.00 mU/L   Comprehensive metabolic panel   Result Value Ref Range    Sodium 141 133 - 144 mmol/L    Potassium 3.6 3.4 - 5.3 mmol/L    Chloride 108 94 - 109 mmol/L    Carbon Dioxide 22 20 - 32 mmol/L    Anion Gap 11 3 - 14 mmol/L    Glucose 176 (H) 70 - 99 mg/dL    Urea Nitrogen 11 7 - 30 mg/dL    Creatinine 0.72 0.66 - 1.25 mg/dL    GFR Estimate >90 >60 mL/min/[1.73_m2]    GFR " "Estimate If Black >90 >60 mL/min/[1.73_m2]    Calcium 8.8 8.5 - 10.1 mg/dL    Bilirubin Total 0.5 0.2 - 1.3 mg/dL    Albumin 3.3 (L) 3.4 - 5.0 g/dL    Protein Total 7.0 6.8 - 8.8 g/dL    Alkaline Phosphatase 109 40 - 150 U/L    ALT 57 0 - 70 U/L    AST 59 (H) 0 - 45 U/L   CBC with platelets   Result Value Ref Range    WBC 6.4 4.0 - 11.0 10e9/L    RBC Count 4.40 4.4 - 5.9 10e12/L    Hemoglobin 13.4 13.3 - 17.7 g/dL    Hematocrit 37.1 (L) 40.0 - 53.0 %    MCV 84 78 - 100 fl    MCH 30.5 26.5 - 33.0 pg    MCHC 36.1 31.5 - 36.5 g/dL    RDW 12.9 10.0 - 15.0 %    Platelet Count 213 150 - 450 10e9/L       ASSESSMENT/PLAN:       BMI:   Estimated body mass index is 27.32 kg/m  as calculated from the following:    Height as of this encounter: 1.753 m (5' 9\").    Weight as of this encounter: 83.9 kg (185 lb).   Weight management plan: Discussed healthy diet and exercise guidelines      1. Cough  Possible relation to postnasal drainage.  mucinex recommended.  Symptomatic care as listed.      2. Uncontrolled type 2 diabetes mellitus with hyperglycemia (H)  nonfasting labs.  a1c borderline improved.  Adjustment of medication recommended.  Add Jardiance would be recommended.  Patient will be contacted to discuss this.    - HEMOGLOBIN A1C  - Albumin Random Urine Quantitative with Creat Ratio  - TSH WITH FREE T4 REFLEX  - Comprehensive metabolic panel  - glipiZIDE (GLUCOTROL) 5 MG tablet; TAKE 1 TABLET BY MOUTH TWICE DAILY WITH MEALS WITH METFORMIN  Dispense: 180 tablet; Refill: 0  - metFORMIN (GLUCOPHAGE) 1000 MG tablet; TAKE 1 TABLET BY MOUTH TWICE DAILY WITH MEALS  Dispense: 180 tablet; Refill: 0    3. Hypertension goal BP (blood pressure) < 140/90  Controlled.  Continue current treatment.  - Albumin Random Urine Quantitative with Creat Ratio  - Comprehensive metabolic panel  - CBC with platelets  - lisinopril (PRINIVIL/ZESTRIL) 20 MG tablet; TAKE 1 TABLET(20 MG) BY MOUTH DAILY  Dispense: 90 tablet; Refill: 0    4. Hyperlipidemia " LDL goal <100  Due for labs.  Fasting.  - atorvastatin (LIPITOR) 40 MG tablet; Take 1 tablet (40 mg) by mouth daily  Dispense: 90 tablet; Refill: 0    Patient Instructions   Mucinex ER (guaifenesin) 600 mg twice a day for 5-7 days.  This will help to break up the mucus and allow you to clear it so your cough improves.      NON PRESCRIPTION TREATMENT    Increase humidity to 30-40% in bedroom at night - vaporizer  Avoid decongestant  Saline nasal spray as needed  Increase fluid intake  Benadryl 25mg 1/2 - 1 hour before bed time  Maintain 8 hr minimum of sleep at night  Robitussin DM cough gels for cough      Non-fasting labs today.    Consider return to clinic for fasting labs in future.      Keturah Lyn MD  [facility]

## 2019-04-16 LAB
ALBUMIN SERPL-MCNC: 3.3 G/DL (ref 3.4–5)
ALP SERPL-CCNC: 109 U/L (ref 40–150)
ALT SERPL W P-5'-P-CCNC: 57 U/L (ref 0–70)
ANION GAP SERPL CALCULATED.3IONS-SCNC: 11 MMOL/L (ref 3–14)
AST SERPL W P-5'-P-CCNC: 59 U/L (ref 0–45)
BILIRUB SERPL-MCNC: 0.5 MG/DL (ref 0.2–1.3)
BUN SERPL-MCNC: 11 MG/DL (ref 7–30)
CALCIUM SERPL-MCNC: 8.8 MG/DL (ref 8.5–10.1)
CHLORIDE SERPL-SCNC: 108 MMOL/L (ref 94–109)
CO2 SERPL-SCNC: 22 MMOL/L (ref 20–32)
CREAT SERPL-MCNC: 0.72 MG/DL (ref 0.66–1.25)
CREAT UR-MCNC: 318 MG/DL
GFR SERPL CREATININE-BSD FRML MDRD: >90 ML/MIN/{1.73_M2}
GLUCOSE SERPL-MCNC: 176 MG/DL (ref 70–99)
MICROALBUMIN UR-MCNC: 58 MG/L
MICROALBUMIN/CREAT UR: 18.27 MG/G CR (ref 0–17)
POTASSIUM SERPL-SCNC: 3.6 MMOL/L (ref 3.4–5.3)
PROT SERPL-MCNC: 7 G/DL (ref 6.8–8.8)
SODIUM SERPL-SCNC: 141 MMOL/L (ref 133–144)
TSH SERPL DL<=0.005 MIU/L-ACNC: 1.67 MU/L (ref 0.4–4)

## 2019-04-16 NOTE — PATIENT INSTRUCTIONS
Mucinex ER (guaifenesin) 600 mg twice a day for 5-7 days.  This will help to break up the mucus and allow you to clear it so your cough improves.      NON PRESCRIPTION TREATMENT    Increase humidity to 30-40% in bedroom at night - vaporizer  Avoid decongestant  Saline nasal spray as needed  Increase fluid intake  Benadryl 25mg 1/2 - 1 hour before bed time  Maintain 8 hr minimum of sleep at night  Robitussin DM cough gels for cough      Non-fasting labs today.    Consider return to clinic for fasting labs in future.

## 2019-04-17 ENCOUNTER — TELEPHONE (OUTPATIENT)
Dept: FAMILY MEDICINE | Facility: CLINIC | Age: 54
End: 2019-04-17

## 2019-04-17 DIAGNOSIS — E11.65 UNCONTROLLED TYPE 2 DIABETES MELLITUS WITH HYPERGLYCEMIA (H): ICD-10-CM

## 2019-04-17 RX ORDER — LISINOPRIL 20 MG/1
TABLET ORAL
Qty: 90 TABLET | Refills: 0 | Status: SHIPPED | OUTPATIENT
Start: 2019-04-17 | End: 2019-07-15

## 2019-04-17 RX ORDER — ATORVASTATIN CALCIUM 40 MG/1
40 TABLET, FILM COATED ORAL DAILY
Qty: 90 TABLET | Refills: 0 | Status: SHIPPED | OUTPATIENT
Start: 2019-04-17 | End: 2019-07-15

## 2019-04-17 RX ORDER — GLIPIZIDE 5 MG/1
TABLET ORAL
Qty: 180 TABLET | Refills: 0 | Status: SHIPPED | OUTPATIENT
Start: 2019-04-17 | End: 2019-07-15

## 2019-04-18 NOTE — TELEPHONE ENCOUNTER
This writer attempted to contact Samson on 04/18/19      Reason for call results and left message.      If patient calls back:   Registered Nurse called. Follow Triage Call workflow        Cee Levi RN

## 2019-04-18 NOTE — TELEPHONE ENCOUNTER
Please call patient re:  Results.  See message below sent as letter  Follow up labs with appointment in 3 months recommended.    PSK      Attached you will find a copy of your recent laboratory report.  Your urine testing shows elevated protein.  This is likely related to the diabetes.  As your blood sugar comes under better control this level may normalize.  Your thyroid testing is normal.  Kidney testing is normal.  Your liver testing shows one borderline abnormal result.  This may be related to your recent illness.  Your blood cell counts are normal.  Your long term blood sugar testing is improved from previous but remains elevated above ideal goal of less than 7%.  I would recommend you continue the metformin and glipizide and begin Jardiance as an additional medication to better control your blood sugars.    Your other medication is recommended as previous.  We will be contacting you on the phone to discuss this.  Please call or MyChart message me if you have any questions.     Sincerely,         Keturah Lyn MD

## 2019-04-18 NOTE — RESULT ENCOUNTER NOTE
Please print letter and attach results.  PSK      Attached you will find a copy of your recent laboratory report.  Your urine testing shows elevated protein.  This is likely related to the diabetes.  As your blood sugar comes under better control this level may normalize.  Your thyroid testing is normal.  Kidney testing is normal.  Your liver testing shows one borderline abnormal result.  This may be related to your recent illness.  Your blood cell counts are normal.  Your long term blood sugar testing is improved from previous but remains elevated above ideal goal of less than 7%.  I would recommend you continue the metformin and glipizide and begin Jardiance as an additional medication to better control your blood sugars.    Your other medication is recommended as previous.  We will be contacting you on the phone to discuss this.  Please call or MyChart message me if you have any questions.    Sincerely,         Keturah Lyn MD

## 2019-04-19 NOTE — TELEPHONE ENCOUNTER
Reason for Call:  Other Results    Detailed comments: Pt returning phone call and was transferred to a Triage Nurse    Phone Number Patient can be reached at: Home number on file [phone] (home)    Best Time: anytime    Can we leave a detailed message on this number? YES    Call taken on 4/19/2019 at 3:04 PM by Jose A East

## 2019-04-19 NOTE — TELEPHONE ENCOUNTER
This writer attempted to contact Samson on 04/19/19      Reason for call results, provider recommendations and left message.      If patient calls back:   Registered Nurse called. Follow Triage Call workflow        Nyla Casanova RN

## 2019-04-19 NOTE — TELEPHONE ENCOUNTER
Writer took call from kassandra.    Spoke with patient. Read results notice per provider to patient, verbatim. Patient verbalized understanding and agreed with all plans. Agreeable to adding new medication, Jardiance. Unable to locate an order for this medication in chart, will forward to provider to order.  Patient understanding to follow up in 3 months. Writer assisted in scheduling patient for Monday, July 15th at 9:00 am. Patient wants to come fasting to this apt and have lipid panel checked at same time (future order in Epic for this, was not fasting at time of apt on 4/17). No questions at this time from patient.    Routing to provider, unable to locate order for Jardiance in chart, can you please order. Patient agreeable to start this. Thank you!    Nyla Casanova RN

## 2019-07-15 ENCOUNTER — OFFICE VISIT (OUTPATIENT)
Dept: FAMILY MEDICINE | Facility: CLINIC | Age: 54
End: 2019-07-15
Payer: COMMERCIAL

## 2019-07-15 VITALS
TEMPERATURE: 97.9 F | HEIGHT: 69 IN | OXYGEN SATURATION: 98 % | BODY MASS INDEX: 28.02 KG/M2 | WEIGHT: 189.2 LBS | DIASTOLIC BLOOD PRESSURE: 79 MMHG | HEART RATE: 73 BPM | RESPIRATION RATE: 20 BRPM | SYSTOLIC BLOOD PRESSURE: 125 MMHG

## 2019-07-15 DIAGNOSIS — I10 HYPERTENSION GOAL BP (BLOOD PRESSURE) < 140/90: ICD-10-CM

## 2019-07-15 DIAGNOSIS — E11.65 UNCONTROLLED TYPE 2 DIABETES MELLITUS WITH HYPERGLYCEMIA (H): Primary | ICD-10-CM

## 2019-07-15 DIAGNOSIS — Z23 NEED FOR PROPHYLACTIC VACCINATION WITH TETANUS-DIPHTHERIA (TD): ICD-10-CM

## 2019-07-15 DIAGNOSIS — E78.5 HYPERLIPIDEMIA LDL GOAL <100: ICD-10-CM

## 2019-07-15 LAB
CHOLEST SERPL-MCNC: 118 MG/DL
HBA1C MFR BLD: 7.7 % (ref 0–5.6)
HDLC SERPL-MCNC: 34 MG/DL
LDLC SERPL CALC-MCNC: 69 MG/DL
NONHDLC SERPL-MCNC: 84 MG/DL
TRIGL SERPL-MCNC: 74 MG/DL

## 2019-07-15 PROCEDURE — 83036 HEMOGLOBIN GLYCOSYLATED A1C: CPT | Performed by: FAMILY MEDICINE

## 2019-07-15 PROCEDURE — 99214 OFFICE O/P EST MOD 30 MIN: CPT | Mod: 25 | Performed by: FAMILY MEDICINE

## 2019-07-15 PROCEDURE — 80061 LIPID PANEL: CPT | Performed by: FAMILY MEDICINE

## 2019-07-15 PROCEDURE — 90471 IMMUNIZATION ADMIN: CPT | Performed by: FAMILY MEDICINE

## 2019-07-15 PROCEDURE — 99207 C FOOT EXAM  NO CHARGE: CPT | Mod: 25 | Performed by: FAMILY MEDICINE

## 2019-07-15 PROCEDURE — 36415 COLL VENOUS BLD VENIPUNCTURE: CPT | Performed by: FAMILY MEDICINE

## 2019-07-15 PROCEDURE — 90714 TD VACC NO PRESV 7 YRS+ IM: CPT | Performed by: FAMILY MEDICINE

## 2019-07-15 RX ORDER — GLIPIZIDE 5 MG/1
TABLET ORAL
Qty: 180 TABLET | Refills: 1 | Status: SHIPPED | OUTPATIENT
Start: 2019-07-15 | End: 2020-03-03

## 2019-07-15 RX ORDER — LISINOPRIL 20 MG/1
TABLET ORAL
Qty: 90 TABLET | Refills: 1 | Status: SHIPPED | OUTPATIENT
Start: 2019-07-15 | End: 2020-03-03

## 2019-07-15 RX ORDER — ATORVASTATIN CALCIUM 40 MG/1
40 TABLET, FILM COATED ORAL DAILY
Qty: 90 TABLET | Refills: 3 | Status: SHIPPED | OUTPATIENT
Start: 2019-07-15 | End: 2020-03-03

## 2019-07-15 ASSESSMENT — MIFFLIN-ST. JEOR: SCORE: 1683.59

## 2019-07-15 ASSESSMENT — PAIN SCALES - GENERAL: PAINLEVEL: NO PAIN (1)

## 2019-07-15 NOTE — PROGRESS NOTES
Subjective     Samson Camarillo is a 53 year old male who presents to clinic today for the following health issues:    HPI   Diabetes Follow-up      How often are you checking your blood sugar? Not at all    What time of day are you checking your blood sugars (select all that apply)?  Before meals    Have you had any blood sugars above 200?  No    Have you had any blood sugars below 70?  No    What symptoms do you notice when your blood sugar is low?  None    What concerns do you have today about your diabetes? None     Do you have any of these symptoms? (Select all that apply)  No numbness or tingling in feet.  No redness, sores or blisters on feet.  No complaints of excessive thirst.  No reports of blurry vision.  No significant changes to weight.     Have you had a diabetic eye exam in the last 12 months? No    BP Readings from Last 2 Encounters:   07/15/19 125/79   04/15/19 119/75     Hemoglobin A1C (%)   Date Value   07/15/2019 7.7 (H)   04/15/2019 9.0 (H)     LDL Cholesterol Calculated (mg/dL)   Date Value   07/15/2019 69   04/26/2018 66       Diabetes Management Resources  Hyperlipidemia Follow-Up      Are you having any of the following symptoms? (Select all that apply)  No complaints of shortness of breath, chest pain or pressure.  No increased sweating or nausea with activity.  No left-sided neck or arm pain.  No complaints of pain in calves when walking 1-2 blocks.    Are you regularly taking any medication or supplement to lower your cholesterol?   Yes- atorvastatin    Are you having muscle aches or other side effects that you think could be caused by your cholesterol lowering medication?  No        Amount of exercise or physical activity: 2-3 days/week for an average of 30-45 minutes    Problems taking medications regularly: No    Medication side effects: none    Diet: regular (no restrictions)      Hypertension Follow-up      Do you check your blood pressure regularly outside of the clinic? No     Are you  "following a low salt diet? No    Are your blood pressures ever more than 140 on the top number (systolic) OR more   than 90 on the bottom number (diastolic), for example 140/90? No    BP Readings from Last 3 Encounters:   07/15/19 125/79   04/15/19 119/75   04/26/18 135/86    Wt Readings from Last 3 Encounters:   07/15/19 85.8 kg (189 lb 3.2 oz)   04/15/19 83.9 kg (185 lb)   04/26/18 88 kg (194 lb)                      Reviewed and updated as needed this visit by Provider  Tobacco  Allergies  Meds  Med Hx  Surg Hx  Fam Hx  Soc Hx        Review of Systems   ROS COMP: Constitutional, HEENT, cardiovascular, pulmonary, gi and gu systems are negative, except as otherwise noted.      Objective    /79 (BP Location: Right arm, Patient Position: Sitting, Cuff Size: Adult Regular)   Pulse 73   Temp 97.9  F (36.6  C) (Oral)   Resp 20   Ht 1.753 m (5' 9\")   Wt 85.8 kg (189 lb 3.2 oz)   SpO2 98%   BMI 27.94 kg/m    Body mass index is 27.94 kg/m .  Physical Exam   GENERAL: alert, no distress and over weight  NECK: no adenopathy, no asymmetry, masses, or scars and thyroid normal to palpation  RESP: lungs clear to auscultation - no rales, rhonchi or wheezes  CV: regular rate and rhythm, normal S1 S2, no S3 or S4, no murmur, click or rub, no peripheral edema and peripheral pulses strong  ABDOMEN: soft, nontender, no hepatosplenomegaly, no masses and bowel sounds normal  MS: no gross musculoskeletal defects noted, no edema  BACK: no CVA tenderness, no paralumbar tenderness  PSYCH: mentation appears normal, affect normal/bright  Diabetic foot exam: normal DP and PT pulses, no trophic changes or ulcerative lesions, normal sensory exam and normal monofilament exam    Diagnostic Test Results:  Labs reviewed in Epic  Results for orders placed or performed in visit on 07/15/19 (from the past 24 hour(s))   Lipid panel reflex to direct LDL Fasting   Result Value Ref Range    Cholesterol 118 <200 mg/dL    Triglycerides 74 " "<150 mg/dL    HDL Cholesterol 34 (L) >39 mg/dL    LDL Cholesterol Calculated 69 <100 mg/dL    Non HDL Cholesterol 84 <130 mg/dL   Hemoglobin A1c   Result Value Ref Range    Hemoglobin A1C 7.7 (H) 0 - 5.6 %           Assessment & Plan     1. Uncontrolled type 2 diabetes mellitus with hyperglycemia (H)/5. Uncontrolled type 2 diabetes with neuropathy (H)  Improved control with current treatment.  Continue current treatment and follow up in 6 months.   - FOOT EXAM  - Hemoglobin A1c  - metFORMIN (GLUCOPHAGE) 1000 MG tablet; TAKE 1 TABLET BY MOUTH TWICE DAILY WITH MEALS  Dispense: 180 tablet; Refill: 1  - glipiZIDE (GLUCOTROL) 5 MG tablet; TAKE 1 TABLET BY MOUTH TWICE DAILY WITH MEALS WITH METFORMIN  Dispense: 180 tablet; Refill: 1  - empagliflozin (JARDIANCE) 10 MG TABS tablet; Take 1 tablet (10 mg) by mouth daily  Dispense: 90 tablet; Refill: 1    2. Hyperlipidemia LDL goal <100  Well controlled.   - Lipid panel reflex to direct LDL Fasting  - atorvastatin (LIPITOR) 40 MG tablet; Take 1 tablet (40 mg) by mouth daily  Dispense: 90 tablet; Refill: 3    3. Hypertension goal BP (blood pressure) < 140/90  Controlled.  Continue current treatment.   - lisinopril (PRINIVIL/ZESTRIL) 20 MG tablet; TAKE 1 TABLET(20 MG) BY MOUTH DAILY  Dispense: 90 tablet; Refill: 1    4. Need for prophylactic vaccination with tetanus-diphtheria (Td)  - TD (ADULT, 7+) PRESERVE FREE  - ADMIN 1st VACCINE         BMI:   Estimated body mass index is 27.94 kg/m  as calculated from the following:    Height as of this encounter: 1.753 m (5' 9\").    Weight as of this encounter: 85.8 kg (189 lb 3.2 oz).   Weight management plan: Discussed healthy diet and exercise guidelines  Wt Readings from Last 5 Encounters:   07/15/19 85.8 kg (189 lb 3.2 oz)   04/15/19 83.9 kg (185 lb)   04/26/18 88 kg (194 lb)   04/04/17 87.1 kg (192 lb)   03/16/16 84.4 kg (186 lb)           Patient Instructions   You due for your eye exam.    Labs today.       I would recommend the " shingles vaccine.  This is a series of 2 vaccines, 2-6 months apart.  You will want to verify coverage with your insurance.  It may be covered better at the pharmacy.           Return in about 6 months (around 1/15/2020) for Physical Exam, Lab Work.    Keturah Lyn MD  [employer]

## 2019-07-15 NOTE — RESULT ENCOUNTER NOTE
Please print letter and attach results.  PSK    Attached you will find a copy of your recent laboratory report.  Your cholesterol testing indicates good control of your cholesterol levels with medication.  I am sending additional refills of the atorvastatin now.  Your long term blood sugar testing is MUCH improved and in the acceptable range now for you.  I would recommend you continue your current medication.  Please call or MyChart message me if you have any questions.    Sincerely,         Keturah Lyn MD

## 2019-07-15 NOTE — LETTER
[address]  [address]  [phone]    July 15, 2019      Samson Camarillo  [id number] [place] [id number]      Dear Samson,    Attached you will find a copy of your recent laboratory report.     Your cholesterol testing indicates good control of your cholesterol levels with medication.  I am sending additional refills of the atorvastatin now.     Your long term blood sugar testing is MUCH improved and in the acceptable range now for you.  I would recommend you continue your current medication.     Please call or MyChart message me if you have any questions.       Sincerely,         Keturah Lyn MD       Results for orders placed or performed in visit on 07/15/19   Lipid panel reflex to direct LDL Fasting   Result Value Ref Range    Cholesterol 118 <200 mg/dL    Triglycerides 74 <150 mg/dL    HDL Cholesterol 34 (L) >39 mg/dL    LDL Cholesterol Calculated 69 <100 mg/dL    Non HDL Cholesterol 84 <130 mg/dL   Hemoglobin A1c   Result Value Ref Range    Hemoglobin A1C 7.7 (H) 0 - 5.6 %

## 2019-07-15 NOTE — PATIENT INSTRUCTIONS
You due for your eye exam.    Labs today.       I would recommend the shingles vaccine.  This is a series of 2 vaccines, 2-6 months apart.  You will want to verify coverage with your insurance.  It may be covered better at the pharmacy.

## 2019-07-15 NOTE — NURSING NOTE
Screening Questionnaire for Adult Immunization    Are you sick today?   No   Do you have allergies to medications, food, a vaccine component or latex?   No   Have you ever had a serious reaction after receiving a vaccination?   No   Do you have a long-term health problem with heart disease, lung disease, asthma, kidney disease, metabolic disease (e.g. diabetes), anemia, or other blood disorder?   Yes   Do you have cancer, leukemia, HIV/AIDS, or any other immune system problem?   No   In the past 3 months, have you taken medications that affect  your immune system, such as prednisone, other steroids, or anticancer drugs; drugs for the treatment of rheumatoid arthritis, Crohn s disease, or psoriasis; or have you had radiation treatments?   No   Have you had a seizure, or a brain or other nervous system problem?   No   During the past year, have you received a transfusion of blood or blood     products, or been given immune (gamma) globulin or antiviral drug?   No   For women: Are you pregnant or is there a chance you could become        pregnant during the next month?   No   Have you received any vaccinations in the past 4 weeks?   No     Immunization questionnaire was positive for at least one answer.  Notified Dr. Lyn.        Per orders of Dr. Lyn, injection of TD given by Talisha Lou. Patient instructed to remain in clinic for 15 minutes afterwards, and to report any adverse reaction to me immediately.       Screening performed by Talisha Lou on 7/15/2019 at 9:37 AM.

## 2019-08-01 ENCOUNTER — ANCILLARY PROCEDURE (OUTPATIENT)
Dept: ULTRASOUND IMAGING | Facility: CLINIC | Age: 54
End: 2019-08-01
Attending: PHYSICIAN ASSISTANT
Payer: COMMERCIAL

## 2019-08-01 ENCOUNTER — OFFICE VISIT (OUTPATIENT)
Dept: FAMILY MEDICINE | Facility: CLINIC | Age: 54
End: 2019-08-01
Payer: COMMERCIAL

## 2019-08-01 VITALS
WEIGHT: 182 LBS | HEIGHT: 69 IN | SYSTOLIC BLOOD PRESSURE: 124 MMHG | TEMPERATURE: 97.9 F | BODY MASS INDEX: 26.96 KG/M2 | RESPIRATION RATE: 20 BRPM | DIASTOLIC BLOOD PRESSURE: 70 MMHG | HEART RATE: 100 BPM | OXYGEN SATURATION: 97 %

## 2019-08-01 DIAGNOSIS — R82.90 NONSPECIFIC FINDING ON EXAMINATION OF URINE: ICD-10-CM

## 2019-08-01 DIAGNOSIS — N10 ACUTE PYELONEPHRITIS: Primary | ICD-10-CM

## 2019-08-01 DIAGNOSIS — R10.9 BILATERAL FLANK PAIN: ICD-10-CM

## 2019-08-01 DIAGNOSIS — R30.0 DYSURIA: ICD-10-CM

## 2019-08-01 DIAGNOSIS — R10.9 LEFT FLANK PAIN: ICD-10-CM

## 2019-08-01 DIAGNOSIS — N30.00 ACUTE CYSTITIS WITHOUT HEMATURIA: ICD-10-CM

## 2019-08-01 LAB
ALBUMIN SERPL-MCNC: 3.3 G/DL (ref 3.4–5)
ALBUMIN UR-MCNC: 100 MG/DL
ALP SERPL-CCNC: 137 U/L (ref 40–150)
ALT SERPL W P-5'-P-CCNC: 27 U/L (ref 0–70)
ANION GAP SERPL CALCULATED.3IONS-SCNC: 8 MMOL/L (ref 3–14)
APPEARANCE UR: CLEAR
AST SERPL W P-5'-P-CCNC: 19 U/L (ref 0–45)
BACTERIA #/AREA URNS HPF: ABNORMAL /HPF
BASOPHILS # BLD AUTO: 0 10E9/L (ref 0–0.2)
BASOPHILS NFR BLD AUTO: 0.4 %
BILIRUB SERPL-MCNC: 0.9 MG/DL (ref 0.2–1.3)
BILIRUB UR QL STRIP: NEGATIVE
BUN SERPL-MCNC: 20 MG/DL (ref 7–30)
CALCIUM SERPL-MCNC: 9.5 MG/DL (ref 8.5–10.1)
CHLORIDE SERPL-SCNC: 102 MMOL/L (ref 94–109)
CO2 SERPL-SCNC: 24 MMOL/L (ref 20–32)
COLOR UR AUTO: YELLOW
CREAT SERPL-MCNC: 1.12 MG/DL (ref 0.66–1.25)
DIFFERENTIAL METHOD BLD: ABNORMAL
EOSINOPHIL # BLD AUTO: 0.3 10E9/L (ref 0–0.7)
EOSINOPHIL NFR BLD AUTO: 2.6 %
ERYTHROCYTE [DISTWIDTH] IN BLOOD BY AUTOMATED COUNT: 13.1 % (ref 10–15)
GFR SERPL CREATININE-BSD FRML MDRD: 73 ML/MIN/{1.73_M2}
GLUCOSE SERPL-MCNC: 195 MG/DL (ref 70–99)
GLUCOSE UR STRIP-MCNC: 500 MG/DL
HCT VFR BLD AUTO: 42.4 % (ref 40–53)
HGB BLD-MCNC: 15 G/DL (ref 13.3–17.7)
HGB UR QL STRIP: ABNORMAL
KETONES UR STRIP-MCNC: 40 MG/DL
LEUKOCYTE ESTERASE UR QL STRIP: ABNORMAL
LYMPHOCYTES # BLD AUTO: 1.3 10E9/L (ref 0.8–5.3)
LYMPHOCYTES NFR BLD AUTO: 13.3 %
MCH RBC QN AUTO: 30.7 PG (ref 26.5–33)
MCHC RBC AUTO-ENTMCNC: 35.4 G/DL (ref 31.5–36.5)
MCV RBC AUTO: 87 FL (ref 78–100)
MONOCYTES # BLD AUTO: 1.4 10E9/L (ref 0–1.3)
MONOCYTES NFR BLD AUTO: 14.1 %
NEUTROPHILS # BLD AUTO: 6.8 10E9/L (ref 1.6–8.3)
NEUTROPHILS NFR BLD AUTO: 69.6 %
NITRATE UR QL: POSITIVE
NON-SQ EPI CELLS #/AREA URNS LPF: ABNORMAL /LPF
PH UR STRIP: 5.5 PH (ref 5–7)
PLATELET # BLD AUTO: 234 10E9/L (ref 150–450)
POTASSIUM SERPL-SCNC: 3.6 MMOL/L (ref 3.4–5.3)
PROT SERPL-MCNC: 7.9 G/DL (ref 6.8–8.8)
RBC # BLD AUTO: 4.88 10E12/L (ref 4.4–5.9)
RBC #/AREA URNS AUTO: ABNORMAL /HPF
SODIUM SERPL-SCNC: 134 MMOL/L (ref 133–144)
SOURCE: ABNORMAL
SP GR UR STRIP: <=1.005 (ref 1–1.03)
UROBILINOGEN UR STRIP-ACNC: 0.2 EU/DL (ref 0.2–1)
WBC # BLD AUTO: 9.7 10E9/L (ref 4–11)
WBC #/AREA URNS AUTO: ABNORMAL /HPF

## 2019-08-01 PROCEDURE — 76770 US EXAM ABDO BACK WALL COMP: CPT | Performed by: RADIOLOGY

## 2019-08-01 PROCEDURE — 85025 COMPLETE CBC W/AUTO DIFF WBC: CPT | Performed by: PHYSICIAN ASSISTANT

## 2019-08-01 PROCEDURE — 99215 OFFICE O/P EST HI 40 MIN: CPT | Performed by: PHYSICIAN ASSISTANT

## 2019-08-01 PROCEDURE — 87186 SC STD MICRODIL/AGAR DIL: CPT | Performed by: PHYSICIAN ASSISTANT

## 2019-08-01 PROCEDURE — 80053 COMPREHEN METABOLIC PANEL: CPT | Performed by: PHYSICIAN ASSISTANT

## 2019-08-01 PROCEDURE — 36415 COLL VENOUS BLD VENIPUNCTURE: CPT | Performed by: PHYSICIAN ASSISTANT

## 2019-08-01 PROCEDURE — 81001 URINALYSIS AUTO W/SCOPE: CPT | Performed by: PHYSICIAN ASSISTANT

## 2019-08-01 PROCEDURE — 87088 URINE BACTERIA CULTURE: CPT | Performed by: PHYSICIAN ASSISTANT

## 2019-08-01 PROCEDURE — 87086 URINE CULTURE/COLONY COUNT: CPT | Performed by: PHYSICIAN ASSISTANT

## 2019-08-01 RX ORDER — LEVOFLOXACIN 500 MG/1
500 TABLET, FILM COATED ORAL DAILY
Qty: 10 TABLET | Refills: 0 | Status: SHIPPED | OUTPATIENT
Start: 2019-08-01 | End: 2020-06-25

## 2019-08-01 ASSESSMENT — MIFFLIN-ST. JEOR: SCORE: 1650.93

## 2019-08-01 ASSESSMENT — PAIN SCALES - GENERAL: PAINLEVEL: SEVERE PAIN (6)

## 2019-08-01 NOTE — PATIENT INSTRUCTIONS
Take levaquin 500 mg daily   Have ultrasound at [facility] ([phone]) today to look for kidney stone and evaluate the kidney  Go to emergency department if increasing pain, fever, vomiting or other change in symptoms.   Follow up with us in approximately 14 days to recheck urine

## 2019-08-01 NOTE — PROGRESS NOTES
Subjective     Samson Camarillo is a 53 year old male who presents to clinic today for the following health issues:    HPI   FLANK   PAIN     Onset: Sunday    Description:   Character: Sharp  Location: right flank left flank  Radiation: None    Intensity: 7-10/10    Progression of Symptoms:  constant    Accompanying Signs & Symptoms:  Fever/Chills?: YES  Gas/Bloating: YES- gas  Nausea: YES  Vomitting: no   Diarrhea?: YES  Constipation:no   Dysuria or Hematuria: dark in color   History:   Trauma: was doing yard work this weekend  Previous similar pain: no    Previous tests done: none    Precipitating factors:   Does the pain change with:     Food: no      BM: no     Urination: YES- in both sides    Alleviating factors:  none    Therapies Tried and outcome: tylenol    LMP:  not applicable       May have had kidney stone and passed that through-2001   Has had fever and chills  Went to work on Monday (3 days ago)  [place] and [place].  Was humid.   Next day aching all over and thought due to physically drained self  Went to work and freezing to death.  Later on that day burning up- Monday ( 3 days ago)   Left work early Monday  Flank pain started late Monday night - Tuesday  Haven't checked blood sugars  No pain with dysuria  Pain back and forth in both flanks       Patient Active Problem List   Diagnosis     Chemical dependency (H)     Anxiety     Psoriasis     HYPERLIPIDEMIA LDL GOAL <100     Erectile dysfunction     Type 2 diabetes mellitus, uncontrolled (H)     Hypertension goal BP (blood pressure) < 140/90     Overweight (BMI 25.0-29.9)     Uncontrolled type 2 diabetes with neuropathy (H)     Past Surgical History:   Procedure Laterality Date     C AFF PREP FACE/ORAL PROST UNLISTED       COLONOSCOPY N/A 6/15/2015    Procedure: COLONOSCOPY;  Surgeon: Jonnie Dwyer MD;  Location: [facility] OR     COLONOSCOPY WITH CO2 INSUFFLATION N/A 6/15/2015    Procedure: COLONOSCOPY WITH CO2  INSUFFLATION;  Surgeon: Jonnie Dwyer MD;  Location: [facility] OR     ECHO STRESS TEST       ENDOSCOPIC SINUS SURGERY  2015    right maxillary sinus     SINUS SURGERY Bilateral 16    Dr. Quesada     SINUS SURGERY  2016       Social History     Tobacco Use     Smoking status: Former Smoker     Types: Cigarettes     Last attempt to quit: 2019     Years since quittin.3     Smokeless tobacco: Never Used     Tobacco comment: 1 pack per week   Substance Use Topics     Alcohol use: Yes     Alcohol/week: 0.0 oz     Comment: 1 drink per month     Family History   Problem Relation Age of Onset     Hypertension Mother      Heart Disease Mother      Thyroid Disease Mother      Kidney Disease Mother         stage 2 to 3     Diabetes Father      Hypertension Father      Cerebrovascular Disease Father      Diabetes Maternal Grandfather      Cancer Paternal Grandmother      Heart Disease Paternal Grandfather          Current Outpatient Medications   Medication Sig Dispense Refill     aspirin 81 MG tablet Take by mouth daily (with dinner)       atorvastatin (LIPITOR) 40 MG tablet Take 1 tablet (40 mg) by mouth daily 90 tablet 3     SONA CONTOUR test strip TEST TWICE DAILY 200 each 4     empagliflozin (JARDIANCE) 10 MG TABS tablet Take 1 tablet (10 mg) by mouth daily 90 tablet 1     glipiZIDE (GLUCOTROL) 5 MG tablet TAKE 1 TABLET BY MOUTH TWICE DAILY WITH MEALS WITH METFORMIN 180 tablet 1            lisinopril (PRINIVIL/ZESTRIL) 20 MG tablet TAKE 1 TABLET(20 MG) BY MOUTH DAILY 90 tablet 1     metFORMIN (GLUCOPHAGE) 1000 MG tablet TAKE 1 TABLET BY MOUTH TWICE DAILY WITH MEALS 180 tablet 1     ORDER FOR DME Equipment being ordered:  Glucose monitor and test strips to match . Must test twice a day  Dispense one kit and 3 months of test strips with 4 refills. 1 each PRN     sildenafil (VIAGRA) 50 MG tablet Take 1 tablet (50 mg) by mouth daily as needed 30 min to 4 hrs before sex. Do not use with nitroglycerin,  "terazosin or doxazosin. 12 tablet 1     BP Readings from Last 3 Encounters:   08/01/19 124/70   07/15/19 125/79   04/15/19 119/75    Wt Readings from Last 3 Encounters:   08/01/19 82.6 kg (182 lb)   07/15/19 85.8 kg (189 lb 3.2 oz)   04/15/19 83.9 kg (185 lb)                      Reviewed and updated as needed this visit by Provider  Tobacco  Allergies  Meds  Problems  Med Hx  Surg Hx  Fam Hx  Soc Hx          Review of Systems   ROS COMP: Constitutional, HEENT, cardiovascular, pulmonary, gi and gu systems are negative, except as otherwise noted.      Objective    /70 (BP Location: Right arm, Patient Position: Chair, Cuff Size: Adult Regular)   Pulse 100   Temp 97.9  F (36.6  C) (Oral)   Resp 20   Ht 1.753 m (5' 9\")   Wt 82.6 kg (182 lb)   SpO2 97%   BMI 26.88 kg/m    Body mass index is 26.88 kg/m .  Physical Exam   GENERAL: healthy, alert and no distress  NECK: no adenopathy, no asymmetry, masses, or scars and thyroid normal to palpation  RESP: lungs clear to auscultation - no rales, rhonchi or wheezes  CV: regular rate and rhythm, normal S1 S2, no S3 or S4, no murmur, click or rub, no peripheral edema and peripheral pulses strong  ABDOMEN: soft, nontender, no hepatosplenomegaly, no masses and bowel sounds normal  MS: no gross musculoskeletal defects noted, no edema  BACK: no CVA tenderness, no paralumbar tenderness    Diagnostic Test Results:  Results for orders placed or performed in visit on 08/01/19   *UA reflex to Microscopic and Culture ([place] [facility] (except [place] and [place])   Result Value Ref Range    Color Urine Yellow     Appearance Urine Clear     Glucose Urine 500 (A) NEG^Negative mg/dL    Bilirubin Urine Negative NEG^Negative    Ketones Urine 40 (A) NEG^Negative mg/dL    Specific Gravity Urine <=1.005 1.003 - 1.035    Blood Urine Large (A) NEG^Negative    pH Urine 5.5 5.0 - 7.0 pH    Protein Albumin Urine 100 (A) NEG^Negative mg/dL    Urobilinogen Urine 0.2 0.2 - " 1.0 EU/dL    Nitrite Urine Positive (A) NEG^Negative    Leukocyte Esterase Urine Small (A) NEG^Negative    Source Midstream Urine    Urine Microscopic   Result Value Ref Range    WBC Urine  (A) OTO5^0 - 5 /HPF    RBC Urine 25-50 (A) OTO2^O - 2 /HPF    Squamous Epithelial /LPF Urine Few FEW^Few /LPF    Bacteria Urine Many (A) NEG^Negative /HPF   Comprehensive metabolic panel   Result Value Ref Range    Sodium 134 133 - 144 mmol/L    Potassium 3.6 3.4 - 5.3 mmol/L    Chloride 102 94 - 109 mmol/L    Carbon Dioxide 24 20 - 32 mmol/L    Anion Gap 8 3 - 14 mmol/L    Glucose 195 (H) 70 - 99 mg/dL    Urea Nitrogen 20 7 - 30 mg/dL    Creatinine 1.12 0.66 - 1.25 mg/dL    GFR Estimate 73 >60 mL/min/[1.73_m2]    GFR Estimate If Black 85 >60 mL/min/[1.73_m2]    Calcium 9.5 8.5 - 10.1 mg/dL    Bilirubin Total 0.9 0.2 - 1.3 mg/dL    Albumin 3.3 (L) 3.4 - 5.0 g/dL    Protein Total 7.9 6.8 - 8.8 g/dL    Alkaline Phosphatase 137 40 - 150 U/L    ALT 27 0 - 70 U/L    AST 19 0 - 45 U/L   CBC with platelets differential   Result Value Ref Range    WBC 9.7 4.0 - 11.0 10e9/L    RBC Count 4.88 4.4 - 5.9 10e12/L    Hemoglobin 15.0 13.3 - 17.7 g/dL    Hematocrit 42.4 40.0 - 53.0 %    MCV 87 78 - 100 fl    MCH 30.7 26.5 - 33.0 pg    MCHC 35.4 31.5 - 36.5 g/dL    RDW 13.1 10.0 - 15.0 %    Platelet Count 234 150 - 450 10e9/L    % Neutrophils 69.6 %    % Lymphocytes 13.3 %    % Monocytes 14.1 %    % Eosinophils 2.6 %    % Basophils 0.4 %    Absolute Neutrophil 6.8 1.6 - 8.3 10e9/L    Absolute Lymphocytes 1.3 0.8 - 5.3 10e9/L    Absolute Monocytes 1.4 (H) 0.0 - 1.3 10e9/L    Absolute Eosinophils 0.3 0.0 - 0.7 10e9/L    Absolute Basophils 0.0 0.0 - 0.2 10e9/L    Diff Method Automated Method    Urine Culture Aerobic Bacterial   Result Value Ref Range    Specimen Description Midstream Urine     Culture Micro >100,000 colonies/mL  Escherichia coli   (A)        Susceptibility    Escherichia coli - SHEELA     AMPICILLIN <=2 Sensitive ug/mL      CEFAZOLIN* <=4 Sensitive ug/mL      * Cefazolin SHEELA breakpoints are for the treatment of uncomplicated urinary tract infections.  For the treatment of systemic infections, please contact the laboratory for additional testing.     CEFOXITIN <=4 Sensitive ug/mL     CEFTAZIDIME <=1 Sensitive ug/mL     CEFTRIAXONE <=1 Sensitive ug/mL     CIPROFLOXACIN <=0.25 Sensitive ug/mL     GENTAMICIN <=1 Sensitive ug/mL     LEVOFLOXACIN <=0.12 Sensitive ug/mL     NITROFURANTOIN <=16 Sensitive ug/mL     TOBRAMYCIN <=1 Sensitive ug/mL     Trimethoprim/Sulfa <=1/19 Sensitive ug/mL     AMPICILLIN/SULBACTAM <=2 Sensitive ug/mL     Piperacillin/Tazo <=4 Sensitive ug/mL     CEFEPIME <=1 Sensitive ug/mL           Assessment & Plan     1. Acute pyelonephritis  Normal cbc but history of fever Will treat with levquin.  Warning signs and symptoms [place] emergency department evaluation reviewed.   - levofloxacin (LEVAQUIN) 500 MG tablet; Take 1 tablet (500 mg) by mouth daily  Dispense: 10 tablet; Refill: 0    2. Left flank pain  Normal blood counts.  Urinalysis with infection.  Electrolytes and kidney and liver function normal   - *UA reflex to Microscopic and Culture ([place] and [facility] (except [place] and [place])  - Comprehensive metabolic panel  - CBC with platelets differential    3. Dysuria    - Urine Microscopic    4. Uncontrolled type 2 diabetes with neuropathy (H)  Blood sugar 195  - Comprehensive metabolic panel    5. Nonspecific finding on examination of urine    - Urine Culture Aerobic Bacterial    6. Bilateral flank pain  US today to evaluated for renal stone.   Renal ultrasound without stone or hydronephrosis - bladder wall thickening consistent with cystitis   - US Renal Complete; Future    7. Acute cystitis without hematuria    - US Renal Complete; Future           Patient Instructions   Take levaquin 500 mg daily   Have ultrasound at [facility] ([phone]) today to look  for kidney stone and evaluate the kidney  Go to emergency department if increasing pain, fever, vomiting or other change in symptoms.   Follow up with us in approximately 14 days to recheck urine       Return in about 14 months (around 10/1/2020).    Alexandra House PA-C  [facility]

## 2019-08-01 NOTE — RESULT ENCOUNTER NOTE
Please call and advise that there were no kidney stones or signs of kidney infection but did have thickening of bladder wall consistent with infection.  Continue antibiotics until gone.  Follow up with us in approximately 12-14 days to recheck urine.  Go to emergency department if any change in symptoms.

## 2019-08-01 NOTE — LETTER
August 1, 2019      Samson Camarillo  [address] [id number]        To Whom It May Concern,     Please excuse Samson from Tuesday July 30 through Saturday August 3 due to illness.  He may return to work without restrictions Monday August 5.       Sincerely,        Alexandra House PA-C

## 2019-08-01 NOTE — LETTER
[facility]  [address] [place]  [address]  [phone]    August 2, 2019      Samson Camarillo  [id number] [place] [id number]      Dear Samson,    Your blood sugar was 195     Your electrolytes, kidney function and liver function were normal.       Your blood counts and hemoglobin were normal.     Your urine culture is still pending.     Please call or MyChart my office with any questions or concerns.         Alexandra House, PAC       Results for orders placed or performed in visit on 08/01/19   *UA reflex to Microscopic and Culture ([place] [facility] (except [place] and [place])   Result Value Ref Range    Color Urine Yellow     Appearance Urine Clear     Glucose Urine 500 (A) NEG^Negative mg/dL    Bilirubin Urine Negative NEG^Negative    Ketones Urine 40 (A) NEG^Negative mg/dL    Specific Gravity Urine <=1.005 1.003 - 1.035    Blood Urine Large (A) NEG^Negative    pH Urine 5.5 5.0 - 7.0 pH    Protein Albumin Urine 100 (A) NEG^Negative mg/dL    Urobilinogen Urine 0.2 0.2 - 1.0 EU/dL    Nitrite Urine Positive (A) NEG^Negative    Leukocyte Esterase Urine Small (A) NEG^Negative    Source Midstream Urine    Urine Microscopic   Result Value Ref Range    WBC Urine  (A) OTO5^0 - 5 /HPF    RBC Urine 25-50 (A) OTO2^O - 2 /HPF    Squamous Epithelial /LPF Urine Few FEW^Few /LPF    Bacteria Urine Many (A) NEG^Negative /HPF   Comprehensive metabolic panel   Result Value Ref Range    Sodium 134 133 - 144 mmol/L    Potassium 3.6 3.4 - 5.3 mmol/L    Chloride 102 94 - 109 mmol/L    Carbon Dioxide 24 20 - 32 mmol/L    Anion Gap 8 3 - 14 mmol/L    Glucose 195 (H) 70 - 99 mg/dL    Urea Nitrogen 20 7 - 30 mg/dL    Creatinine 1.12 0.66 - 1.25 mg/dL    GFR Estimate 73 >60 mL/min/[1.73_m2]    GFR Estimate If Black 85 >60 mL/min/[1.73_m2]    Calcium 9.5 8.5 - 10.1 mg/dL    Bilirubin Total 0.9 0.2 - 1.3 mg/dL    Albumin 3.3 (L) 3.4 - 5.0 g/dL    Protein Total 7.9 6.8 -  8.8 g/dL    Alkaline Phosphatase 137 40 - 150 U/L    ALT 27 0 - 70 U/L    AST 19 0 - 45 U/L   CBC with platelets differential   Result Value Ref Range    WBC 9.7 4.0 - 11.0 10e9/L    RBC Count 4.88 4.4 - 5.9 10e12/L    Hemoglobin 15.0 13.3 - 17.7 g/dL    Hematocrit 42.4 40.0 - 53.0 %    MCV 87 78 - 100 fl    MCH 30.7 26.5 - 33.0 pg    MCHC 35.4 31.5 - 36.5 g/dL    RDW 13.1 10.0 - 15.0 %    Platelet Count 234 150 - 450 10e9/L    % Neutrophils 69.6 %    % Lymphocytes 13.3 %    % Monocytes 14.1 %    % Eosinophils 2.6 %    % Basophils 0.4 %    Absolute Neutrophil 6.8 1.6 - 8.3 10e9/L    Absolute Lymphocytes 1.3 0.8 - 5.3 10e9/L    Absolute Monocytes 1.4 (H) 0.0 - 1.3 10e9/L    Absolute Eosinophils 0.3 0.0 - 0.7 10e9/L    Absolute Basophils 0.0 0.0 - 0.2 10e9/L    Diff Method Automated Method    Urine Culture Aerobic Bacterial   Result Value Ref Range    Specimen Description Midstream Urine     Culture Micro       Referred to [facility], await final report.

## 2019-08-02 NOTE — RESULT ENCOUNTER NOTE
Please send letter with lab results.     Dear Samson  Your blood sugar was 195  Your electrolytes, kidney function and liver function were normal.    Your blood counts and hemoglobin were normal.   Your urine culture is still pending.   Please call or MyChart my office with any questions or concerns.    Alexandra House, PAC

## 2019-08-04 LAB
BACTERIA SPEC CULT: ABNORMAL
SPECIMEN SOURCE: ABNORMAL

## 2020-03-01 DIAGNOSIS — I10 HYPERTENSION GOAL BP (BLOOD PRESSURE) < 140/90: ICD-10-CM

## 2020-03-01 DIAGNOSIS — E78.5 HYPERLIPIDEMIA LDL GOAL <100: ICD-10-CM

## 2020-03-01 DIAGNOSIS — E11.65 UNCONTROLLED TYPE 2 DIABETES MELLITUS WITH HYPERGLYCEMIA (H): ICD-10-CM

## 2020-03-01 NOTE — TELEPHONE ENCOUNTER
"Requested Prescriptions   Pending Prescriptions Disp Refills     lisinopril (ZESTRIL) 20 MG tablet [Pharmacy Med Name: LISINOPRIL 20MG TABLETS] 90 tablet 1     Sig: TAKE 1 TABLET(20 MG) BY MOUTH DAILY         Last Written Prescription Date:  7/15/19  Last Fill Quantity: 90,  # refills: 1   Last Office Visit with [facility] or [facility] prescribing provider:  8/1/19   Future Office Visit:         ACE Inhibitors (Including Combos) Protocol Failed - 3/1/2020  1:03 PM        Failed - Recent (12 mo) or future (30 days) visit within the authorizing provider's specialty     Patient has had an office visit with the authorizing provider or a provider within the authorizing providers department within the previous 12 mos or has a future within next 30 days. See \"Patient Info\" tab in inbasket, or \"Choose Columns\" in Meds & Orders section of the refill encounter.              Passed - Blood pressure under 140/90 in past 12 months     BP Readings from Last 3 Encounters:   08/01/19 124/70   07/15/19 125/79   04/15/19 119/75                 Passed - Medication is active on med list        Passed - Patient is age 18 or older        Passed - Normal serum creatinine on file in past 12 months     Recent Labs   Lab Test 08/01/19  1132   CR 1.12             Passed - Normal serum potassium on file in past 12 months     Recent Labs   Lab Test 08/01/19  1132   POTASSIUM 3.6             glipiZIDE (GLUCOTROL) 5 MG tablet [Pharmacy Med Name: GLIPIZIDE 5MG TABLETS] 180 tablet 1     Sig: TAKE ONE TABLET BY MOUTH TWICE DAILY WITH MEALS WITH METFORMIN AS DIRECTED         Last Written Prescription Date:  7/15/19  Last Fill Quantity: 180,  # refills: 1   Last Office Visit with [facility], [facility] or [facility] prescribing provider:  8/1/19   Future Office Visit:         Sulfonylurea Agents Failed - 3/1/2020  1:03 PM        Failed - Blood pressure less than 140/90 in past 6 months     BP Readings from Last 3 Encounters:   08/01/19 124/70   07/15/19 125/79 " "  04/15/19 119/75                 Failed - Patient has documented A1c within the specified period of time.     If HgbA1C is 8 or greater, it needs to be on file within the past 3 months.  If less than 8, must be on file within the past 6 months.     Recent Labs   Lab Test 07/15/19  0932   A1C 7.7*             Failed - Recent (6 mo) or future (30 days) visit within the authorizing provider's specialty     Patient had office visit in the last 6 months or has a visit in the next 30 days with authorizing provider or within the authorizing provider's specialty.  See \"Patient Info\" tab in inbasket, or \"Choose Columns\" in Meds & Orders section of the refill encounter.            Passed - Patient has documented LDL within the past 12 mos.     Recent Labs   Lab Test 07/15/19  0932   LDL 69             Passed - Patient has had a Microalbumin in the past 15 mos.     Recent Labs   Lab Test 04/15/19  1920   MICROL 58   UMALCR 18.27*             Passed - Medication is active on med list        Passed - Patient is age 18 or older        Passed - Patient has a recent creatinine (normal) within the past 12 mos.     Recent Labs   Lab Test 08/01/19  1132   CR 1.12             atorvastatin (LIPITOR) 40 MG tablet [Pharmacy Med Name: ATORVASTATIN 40MG TABLETS] 90 tablet 3     Sig: TAKE 1 TABLET(40 MG) BY MOUTH DAILY           Last Written Prescription Date:  7/15/19  Last Fill Quantity: 90,  # refills: 3   Last Office Visit with [facility], [facility] or [facility] prescribing provider:  8/1/19   Future Office Visit:         Statins Protocol Failed - 3/1/2020  1:03 PM        Failed - Recent (12 mo) or future (30 days) visit within the authorizing provider's specialty     Patient has had an office visit with the authorizing provider or a provider within the authorizing providers department within the previous 12 mos or has a future within next 30 days. See \"Patient Info\" tab in inbasket, or \"Choose Columns\" in Meds & Orders section of the refill " encounter.              Passed - LDL on file in past 12 months     Recent Labs   Lab Test 07/15/19  0932   LDL 69             Passed - No abnormal creatine kinase in past 12 months     No lab results found.             Passed - Medication is active on med list        Passed - Patient is age 18 or older              Titi Faarax  [facility]

## 2020-03-03 RX ORDER — GLIPIZIDE 5 MG/1
TABLET ORAL
Qty: 60 TABLET | Refills: 0 | Status: SHIPPED | OUTPATIENT
Start: 2020-03-03 | End: 2020-06-25

## 2020-03-03 RX ORDER — LISINOPRIL 20 MG/1
TABLET ORAL
Qty: 90 TABLET | Refills: 1 | Status: SHIPPED | OUTPATIENT
Start: 2020-03-03 | End: 2020-07-14

## 2020-03-03 RX ORDER — ATORVASTATIN CALCIUM 40 MG/1
TABLET, FILM COATED ORAL
Qty: 90 TABLET | Refills: 1 | Status: SHIPPED | OUTPATIENT
Start: 2020-03-03 | End: 2020-07-14

## 2020-03-03 NOTE — TELEPHONE ENCOUNTER
Routing refill request to provider for review/approval because:  Patient needs to be seen because it has been more than 1 year since last office visit with Dr. Mariano.        Dave Castellanos RN, BSN, PHN

## 2020-06-23 DIAGNOSIS — E11.65 UNCONTROLLED TYPE 2 DIABETES MELLITUS WITH HYPERGLYCEMIA (H): ICD-10-CM

## 2020-06-24 NOTE — TELEPHONE ENCOUNTER
Team to please call patient and schedule medication recheck appointment. Please find out if patient has enough medication to last until appointment once scheduled then route back to refill pool. Thank you.        Dave Castellanos RN, BSN, PHN

## 2020-06-25 RX ORDER — GLIPIZIDE 5 MG/1
TABLET ORAL
Qty: 60 TABLET | Refills: 0 | Status: SHIPPED | OUTPATIENT
Start: 2020-06-25 | End: 2020-07-14

## 2020-06-25 NOTE — TELEPHONE ENCOUNTER
This writer attempted to contact Samson on 06/25/20      Reason for call OV and left message.      If patient calls back:   Schedule Office Visit appointment within 1 month with PCP, document that pt called and close encounter         Pavithra Salazar MA

## 2020-06-25 NOTE — TELEPHONE ENCOUNTER
Noted. Virtual visit scheduled for 7/2/20 with PCP.    Prescription for Glipizide refilled x 30 day ariadna refill, approved per [facility] Refill Protocol. This was due, according to last refill.    Team, please return a call to patient to inform him he should have another 90 day supply available at the [facility] pharmacy in [place], for his Lipitor and Lisinopril.  Both of these medication were refilled 3/3/20 x 90 days with 1 refill so should have till September.  Metformin was prescribed on 4/13/20 for 90 days, so should have pills till 7/12/20. Additional refills to be given at time of apt on 7/2.  Thank you.    Nyla Casanova RN  [facility]

## 2020-06-25 NOTE — TELEPHONE ENCOUNTER
Patient returned call and scheduled virtual appointment he states he is completley out of atorvastatin (LIPITOR) 40 MG tablet and lisinopril (ZESTRIL) 20 MG tablet  He has 1 weeks worth left of metFORMIN (GLUCOPHAGE) 1000 MG tablet

## 2020-06-26 NOTE — TELEPHONE ENCOUNTER
This writer attempted to contact Samson on 06/26/20    Reason for call medication refills and left detailed message.    When patient calls back, please contact 1st floor Jenny Shipman. routine priority.        Adarsh Beyer

## 2020-07-02 NOTE — TELEPHONE ENCOUNTER
This writer attempted to contact Samson on 07/02/20      Reason for call Rx refills and left detailed message.      If patient calls back:   1st floor [facility] Care Team (MA/TC) called. Inform patient that someone from the team will contact them, document that pt called and route to care team.         David Glynn MA      Patient had appt with PCP today and missed appt. Called and was informed that patient will be leaving to [facility] today. Informed patient's wife to have patient call us back regarding patient's refills.          QTc 435ms QTc 441ms

## 2020-07-14 ENCOUNTER — VIRTUAL VISIT (OUTPATIENT)
Dept: FAMILY MEDICINE | Facility: CLINIC | Age: 55
End: 2020-07-14
Payer: COMMERCIAL

## 2020-07-14 DIAGNOSIS — I10 HYPERTENSION GOAL BP (BLOOD PRESSURE) < 140/90: ICD-10-CM

## 2020-07-14 DIAGNOSIS — N52.8 OTHER MALE ERECTILE DYSFUNCTION: ICD-10-CM

## 2020-07-14 DIAGNOSIS — E78.5 HYPERLIPIDEMIA LDL GOAL <100: ICD-10-CM

## 2020-07-14 PROCEDURE — 99214 OFFICE O/P EST MOD 30 MIN: CPT | Mod: 95 | Performed by: INTERNAL MEDICINE

## 2020-07-14 RX ORDER — GLIPIZIDE 5 MG/1
5 TABLET ORAL 2 TIMES DAILY WITH MEALS
Qty: 180 TABLET | Refills: 1 | Status: SHIPPED | OUTPATIENT
Start: 2020-07-14 | End: 2020-07-31

## 2020-07-14 RX ORDER — SILDENAFIL 50 MG/1
50 TABLET, FILM COATED ORAL DAILY PRN
Qty: 12 TABLET | Refills: 5 | Status: SHIPPED | OUTPATIENT
Start: 2020-07-14 | End: 2021-01-26

## 2020-07-14 RX ORDER — LISINOPRIL 20 MG/1
20 TABLET ORAL DAILY
Qty: 90 TABLET | Refills: 1 | Status: SHIPPED | OUTPATIENT
Start: 2020-07-14 | End: 2020-12-03

## 2020-07-14 RX ORDER — ATORVASTATIN CALCIUM 40 MG/1
40 TABLET, FILM COATED ORAL DAILY
Qty: 90 TABLET | Refills: 1 | Status: SHIPPED | OUTPATIENT
Start: 2020-07-14 | End: 2020-12-03

## 2020-07-14 NOTE — PROGRESS NOTES
"Samson Camarillo is a 54 year old male who is being evaluated via a billable telephone visit.      The patient has been notified of following:     \"This telephone visit will be conducted via a call between you and your physician/provider. We have found that certain health care needs can be provided without the need for a physical exam.  This service lets us provide the care you need with a short phone conversation.  If a prescription is necessary we can send it directly to your pharmacy.  If lab work is needed we can place an order for that and you can then stop by our lab to have the test done at a later time.    Telephone visits are billed at different rates depending on your insurance coverage. During this emergency period, for some insurers they may be billed the same as an in-person visit.  Please reach out to your insurance provider with any questions.    If during the course of the call the physician/provider feels a telephone visit is not appropriate, you will not be charged for this service.\"    Patient has given verbal consent for Telephone visit?  Yes    What phone number would you like to be contacted at? [phone]    How would you like to obtain your AVS? Mail a copy    Subjective     Samson Camarillo is a 54 year old male who presents via phone visit today for the following health issues:    HPI  Diabetes Follow-up      How often are you checking your blood sugar? Not at all, machine not working. Checks blood glucose twice a day.    What concerns do you have today about your diabetes? None     Do you have any of these symptoms? (Select all that apply)  Numbness in feet    Have you had a diabetic eye exam in the last 12 months? No     Side effects: Yes. Diaphoresis and flank pain from Jardiance. Treated for acute cystitis.      BP Readings from Last 2 Encounters:   08/01/19 124/70   07/15/19 125/79     Hemoglobin A1C (%)   Date Value   07/15/2019 7.7 (H)   04/15/2019 9.0 (H)     LDL Cholesterol Calculated " (mg/dL)   Date Value   07/15/2019 69   2018 66         Hypertension Follow-up      Do you check your blood pressure regularly outside of the clinic? Yes     Are you following a low salt diet? Yes    Are your blood pressures ever more than 140 on the top number (systolic) OR more   than 90 on the bottom number (diastolic), for example 140/90? No, getting 130/75      How many servings of fruits and vegetables do you eat daily?  2-3    On average, how many sweetened beverages do you drink each day (Examples: soda, juice, sweet tea, etc.  Do NOT count diet or artificially sweetened beverages)?   0-1    How many days per week do you exercise enough to make your heart beat faster? 3 or less    How many minutes a day do you exercise enough to make your heart beat faster? 9 or less    How many days per week do you miss taking your medication? 0        Patient Active Problem List   Diagnosis     Chemical dependency (H)     Anxiety     Psoriasis     HYPERLIPIDEMIA LDL GOAL <100     Erectile dysfunction     Hypertension goal BP (blood pressure) < 140/90     Overweight (BMI 25.0-29.9)     Uncontrolled type 2 diabetes with neuropathy (H)     Past Surgical History:   Procedure Laterality Date     C AFF PREP FACE/ORAL PROST UNLISTED       COLONOSCOPY N/A 6/15/2015    Procedure: COLONOSCOPY;  Surgeon: Jonnie Dwyer MD;  Location: [facility] OR     COLONOSCOPY WITH CO2 INSUFFLATION N/A 6/15/2015    Procedure: COLONOSCOPY WITH CO2 INSUFFLATION;  Surgeon: Jonnie Dwyer MD;  Location: [facility] OR     ECHO STRESS TEST       ENDOSCOPIC SINUS SURGERY  2015    right maxillary sinus     SINUS SURGERY Bilateral 16    Dr. Quesada     SINUS SURGERY  2016       Social History     Tobacco Use     Smoking status: Former Smoker     Types: Cigarettes     Last attempt to quit: 2019     Years since quittin.2     Smokeless tobacco: Never Used     Tobacco comment: 1 pack per week   Substance Use Topics     Alcohol use:  Yes     Alcohol/week: 0.0 standard drinks     Comment: 1 drink per month     Family History   Problem Relation Age of Onset     Hypertension Mother      Heart Disease Mother      Thyroid Disease Mother      Kidney Disease Mother         stage 2 to 3     Diabetes Father      Hypertension Father      Cerebrovascular Disease Father      Diabetes Maternal Grandfather      Cancer Paternal Grandmother      Heart Disease Paternal Grandfather          Allergies   Allergen Reactions     Jardiance [Empagliflozin]      Causes cystitis.     Recent Labs   Lab Test 08/01/19  1132 07/15/19  0932 04/15/19  1909 04/26/18  1717 04/04/17  1801   A1C  --  7.7* 9.0* 9.5* 11.4*   LDL  --  69  --  66 134*   HDL  --  34*  --  33* 38*   TRIG  --  74  --  137 247*   ALT 27  --  57 33 44   CR 1.12  --  0.72 0.87 0.84   GFRESTIMATED 73  --  >90 >90 >90  Non African American GFR Calc     GFRESTBLACK 85  --  >90 >90 >90  African American GFR Calc     POTASSIUM 3.6  --  3.6 3.8 3.8   TSH  --   --  1.67  --  1.74      BP Readings from Last 3 Encounters:   08/01/19 124/70   07/15/19 125/79   04/15/19 119/75    Wt Readings from Last 3 Encounters:   08/01/19 82.6 kg (182 lb)   07/15/19 85.8 kg (189 lb 3.2 oz)   04/15/19 83.9 kg (185 lb)                    Reviewed and updated as needed this visit by Provider         Review of Systems   CONSTITUTIONAL: NEGATIVE for fever, chills, change in weight  INTEGUMENTARY/SKIN: NEGATIVE for worrisome rashes, moles or lesions  EYES: NEGATIVE for vision changes or irritation  ENT/MOUTH: NEGATIVE for ear, mouth and throat problems  RESP: NEGATIVE for significant cough or SOB  CV: NEGATIVE for chest pain, palpitations or peripheral edema  GI: NEGATIVE for nausea, abdominal pain, heartburn, or change in bowel habits   male :positive for erectile dysfunction.  MUSCULOSKELETAL: NEGATIVE for significant arthralgias or myalgia  NEURO: NEGATIVE for HX CVA and HX TIA  ENDOCRINE: NEGATIVE for temperature intolerance,  skin/hair changes  HEME: NEGATIVE for bleeding problems  PSYCHIATRIC: NEGATIVE for changes in mood or affect       Objective   Reported vitals:  There were no vitals taken for this visit.     Remainder of exam unable to be completed due to telephone visits    Diagnostic Test Results:  Labs reviewed in Epic        Assessment/Plan:  1. Uncontrolled type 2 diabetes with neuropathy (H)    - Hemoglobin A1c; Standing  - Basic metabolic panel  (Ca, Cl, CO2, Creat, Gluc, K, Na, BUN); Standing  - Albumin Random Urine Quantitative with Creat Ratio; Standing  - glipiZIDE (GLUCOTROL) 5 MG tablet; Take 1 tablet (5 mg) by mouth 2 times daily (with meals) TAKE 1 TABLET BY MOUTH TWICE DAILY WITH MEALS WITH METFORMIN AS DIRECTED.  Dispense: 180 tablet; Refill: 1  - metFORMIN (GLUCOPHAGE) 1000 MG tablet; Take 1 tablet (1,000 mg) by mouth 2 times daily (with meals)  Dispense: 180 tablet; Refill: 1    2. Hypertension goal BP (blood pressure) < 140/90    - Basic metabolic panel  (Ca, Cl, CO2, Creat, Gluc, K, Na, BUN); Standing  - Albumin Random Urine Quantitative with Creat Ratio; Standing  - lisinopril (ZESTRIL) 20 MG tablet; Take 1 tablet (20 mg) by mouth daily  Dispense: 90 tablet; Refill: 1    3. Hyperlipidemia LDL goal <100    - Hepatic panel (Albumin, ALT, AST, Bili, Alk Phos, TP); Standing  - atorvastatin (LIPITOR) 40 MG tablet; Take 1 tablet (40 mg) by mouth daily  Dispense: 90 tablet; Refill: 1    4. Other male erectile dysfunction    - sildenafil (VIAGRA) 50 MG tablet; Take 1 tablet (50 mg) by mouth daily as needed (erectile dysfunction) 30 min to 4 hrs before sex. Do not use with nitroglycerin, terazosin or doxazosin.  Dispense: 12 tablet; Refill: 5      Follow-up in 6 months.    Phone call duration:  17 minutes  Start: 4:20 PM  End: 4:37 PM    Mick Mariano MD

## 2020-07-27 DIAGNOSIS — E78.5 HYPERLIPIDEMIA LDL GOAL <100: ICD-10-CM

## 2020-07-30 RX ORDER — ATORVASTATIN CALCIUM 40 MG/1
TABLET, FILM COATED ORAL
Qty: 90 TABLET | Refills: 1
Start: 2020-07-30

## 2020-07-30 NOTE — TELEPHONE ENCOUNTER
90 day supply with 1 refills sent on 7/14/20. Should have refills on file at pharmacy.      Dave Castellanos RN, BSN, PHN

## 2020-08-20 DIAGNOSIS — I10 HYPERTENSION GOAL BP (BLOOD PRESSURE) < 140/90: ICD-10-CM

## 2020-08-20 DIAGNOSIS — E78.5 HYPERLIPIDEMIA LDL GOAL <100: ICD-10-CM

## 2020-08-20 LAB
ALBUMIN SERPL-MCNC: 4.5 G/DL (ref 3.4–5)
ALP SERPL-CCNC: 99 U/L (ref 40–150)
ALT SERPL W P-5'-P-CCNC: 36 U/L (ref 0–70)
ANION GAP SERPL CALCULATED.3IONS-SCNC: 8 MMOL/L (ref 3–14)
AST SERPL W P-5'-P-CCNC: 21 U/L (ref 0–45)
BILIRUB DIRECT SERPL-MCNC: 0.2 MG/DL (ref 0–0.2)
BILIRUB SERPL-MCNC: 1.3 MG/DL (ref 0.2–1.3)
BUN SERPL-MCNC: 17 MG/DL (ref 7–30)
CALCIUM SERPL-MCNC: 9 MG/DL (ref 8.5–10.1)
CHLORIDE SERPL-SCNC: 105 MMOL/L (ref 94–109)
CO2 SERPL-SCNC: 24 MMOL/L (ref 20–32)
CREAT SERPL-MCNC: 1.02 MG/DL (ref 0.66–1.25)
GFR SERPL CREATININE-BSD FRML MDRD: 81 ML/MIN/{1.73_M2}
GLUCOSE SERPL-MCNC: 277 MG/DL (ref 70–99)
HBA1C MFR BLD: 9.3 % (ref 0–5.6)
POTASSIUM SERPL-SCNC: 3.9 MMOL/L (ref 3.4–5.3)
PROT SERPL-MCNC: 7.7 G/DL (ref 6.8–8.8)
SODIUM SERPL-SCNC: 137 MMOL/L (ref 133–144)

## 2020-08-20 PROCEDURE — 80048 BASIC METABOLIC PNL TOTAL CA: CPT | Performed by: INTERNAL MEDICINE

## 2020-08-20 PROCEDURE — 80076 HEPATIC FUNCTION PANEL: CPT | Performed by: INTERNAL MEDICINE

## 2020-08-20 PROCEDURE — 83036 HEMOGLOBIN GLYCOSYLATED A1C: CPT | Performed by: INTERNAL MEDICINE

## 2020-08-20 PROCEDURE — 36415 COLL VENOUS BLD VENIPUNCTURE: CPT | Performed by: INTERNAL MEDICINE

## 2020-08-20 PROCEDURE — 82043 UR ALBUMIN QUANTITATIVE: CPT | Performed by: INTERNAL MEDICINE

## 2020-08-21 LAB
CREAT UR-MCNC: 250 MG/DL
MICROALBUMIN UR-MCNC: 58 MG/L
MICROALBUMIN/CREAT UR: 23.36 MG/G CR (ref 0–17)

## 2020-11-12 ENCOUNTER — TELEPHONE (OUTPATIENT)
Dept: FAMILY MEDICINE | Facility: CLINIC | Age: 55
End: 2020-11-12

## 2020-11-12 NOTE — TELEPHONE ENCOUNTER
Reason for Call:  Other call back    Detailed comments: Calling to verify fmla paperwork and patients 1st day off work estimated return to work date and diagnosis. Please call to advise so they can complete FMLA for patient     Phone Number Patient can be reached at: Other phone number:  [phone] ext- [phone]    Best Time: Any    Can we leave a detailed message on this number? NO    Call taken on 11/12/2020 at 1:37 PM by Paul Wolfe

## 2020-11-12 NOTE — TELEPHONE ENCOUNTER
Received forms from Dr Mariano and he states that patient has an appointment with Alexandra House on 11/17/2020. Placed forms in Harsh's red folder.  Millie [facility]  2nd Floor  Primary Care

## 2020-11-17 ENCOUNTER — OFFICE VISIT (OUTPATIENT)
Dept: FAMILY MEDICINE | Facility: CLINIC | Age: 55
End: 2020-11-17
Payer: COMMERCIAL

## 2020-11-17 ENCOUNTER — TELEPHONE (OUTPATIENT)
Dept: FAMILY MEDICINE | Facility: CLINIC | Age: 55
End: 2020-11-17

## 2020-11-17 VITALS
SYSTOLIC BLOOD PRESSURE: 135 MMHG | DIASTOLIC BLOOD PRESSURE: 82 MMHG | WEIGHT: 198 LBS | HEART RATE: 98 BPM | HEIGHT: 69 IN | OXYGEN SATURATION: 98 % | BODY MASS INDEX: 29.33 KG/M2

## 2020-11-17 DIAGNOSIS — M79.661 RIGHT CALF PAIN: Primary | ICD-10-CM

## 2020-11-17 PROCEDURE — 99213 OFFICE O/P EST LOW 20 MIN: CPT | Performed by: PHYSICIAN ASSISTANT

## 2020-11-17 ASSESSMENT — PAIN SCALES - GENERAL: PAINLEVEL: MODERATE PAIN (4)

## 2020-11-17 ASSESSMENT — MIFFLIN-ST. JEOR: SCORE: 1713.5

## 2020-11-17 NOTE — PATIENT INSTRUCTIONS
Use crutches - wear ace wrap  Apply ice to right calf 15 minutes at a time several times a day.  Follow up with orthopedics next week- sooner if increasing pain, numbness, tingling or other change in symptoms.   Schedule follow up with Dr. Mariano as soon as possible.   Schedule lab appointment 20 minutes before follow up with Dr. Mariano     At [facility], we strive to deliver an exceptional experience to you, every time we see you. If you receive a survey, please complete it as we do value your feedback.  If you have MyChart, you can expect to receive results automatically within 24 hours of their completion.  Your provider will send a note interpreting your results as well.   If you do not have MyChart, you should receive your results in about a week by mail.    Your care team:                            Family Medicine Internal Medicine   MD Mick Payan, MD Jelly Gordon, MD Aly Chu, MD Harleen Santiago PA-C  Pavithra Gomes, APRN CNP    Samir Messer, MD Pediatrics   Be Vicente, PA-C  Dianna Fan, CNP Zara Catalan, MD Doretha Cedillo APRN CNP   Charlene Howell, MD Melissa Jimenez MD Kim Thein, APRN CNP  Alexandra Montiel, PAVeritoC  Tatyana Guadalupe, CNP  MD Keturah Guo MD Angela Wermerskirchen, MD      Clinic hours: Monday - Thursday 7 am-7 pm; Fridays 7 am-5 pm.   Urgent care: Monday - Friday 11 am-9 pm; Saturday and Sunday 9 am-5 pm.    Clinic: [phone]       [facility]: Monday - Thursday 8 am - 7 pm; Friday 8 am - 6 pm  [facility]: [phone]     Use www.[email or web address] for 24/7 diagnosis and treatment of dozens of conditions.

## 2020-11-17 NOTE — LETTER
November 17, 2020      Samson Camarillo  [address] [address]        To Whom It May Concern,     Please excuse Samson from work 11/9/2020 until 11/23/2020.  He may return to work 11/23/2020 with no lifting or push pulling over 5 pounds.   Follow up with orthopedics next week if not improving.         Sincerely,        Alexandra House PA-C

## 2020-11-17 NOTE — PROGRESS NOTES
"Subjective     Samson Camarillo is a 55 year old male who presents to clinic today for the following health issues:    HPI       I went to [facility] Sunday November 8th .  Kicked a tree- burried in leaves and right foot hit it.  [place] up too much and all my nerves in leg went into a bundle and calf swelled up like a balloon.  Felt like leg kicked back- kicked tree stump by accident  Intense pain.  Ankle isn't bad - pain in right calf area  Tried to drive and wife drove  Went to [facility] and as we were driving felt burning pain in back of thigh  Pain was still intense but could tolerate -was still a 10 by time got to hospital  Once laid down started to calm down a bit and throbbing and shooting pain and he would examine - no fractures- advised achilles ankle is ok.  Calf could have torn.    Wrapped up in ace   Advised to take Ibuprofen or tylenol.  Elevate and Ice   By time got home leg elevated and starting to relax  Sensitive to put weight on it. If moving slow can deal with it.    If push myself   Works in   Pain is improving.   Takes ibuprofen sporadically  Took 2 ibuprofen and 4 tylenol occasionally   Felt ok not having to take it  Improving   Still too dependent on crutches.   By Monday feels would be ok to go back to work  Not icing currently     No work until 11/23/2020 - return with no lifting greater than 5 pounds  11/8/2020 seen at [facility] - reports couldn't get in for appointment until now        Review of Systems   Constitutional, HEENT, cardiovascular, pulmonary, gi and gu systems are negative, except as otherwise noted.      Objective    /82   Pulse 98   Ht 1.753 m (5' 9\")   Wt 89.8 kg (198 lb)   SpO2 98%   BMI 29.24 kg/m    Body mass index is 29.24 kg/m .  Physical Exam   GENERAL: healthy, alert and no distress  NECK: no adenopathy, no asymmetry, masses, or scars and thyroid normal to palpation  RESP: lungs clear to auscultation - no rales, " rhonchi or wheezes  CV: regular rate and rhythm, normal S1 S2, no S3 or S4, no murmur, click or rub, no peripheral edema and peripheral pulses strong  MS: right calf and lower leg wrapped in ace wrap- antalgic gait on crutches.  Wrap removed tender right calf. No ecchymoses. Normal dorsiflexion and plantar flexion of foot with discomfort             Assessment & Plan     Right calf pain  Continue ice, rest, ibuprofen- follow up with ortho if not improving over the next week  FMLA forms completed.  Unable to return to work until Monday 11/23/2020 (excused 11/9 until 11/23/2020 and then no lifting push pulling of 5 pounds   - Orthopedic & Spine  Referral             Patient Instructions     Use crutches - wear ace wrap  Apply ice to right calf 15 minutes at a time several times a day.  Follow up with orthopedics next week- sooner if increasing pain, numbness, tingling or other change in symptoms.   Schedule follow up with Dr. Mariano as soon as possible.   Schedule lab appointment 20 minutes before follow up with Dr. Mariano     At [facility], we strive to deliver an exceptional experience to you, every time we see you. If you receive a survey, please complete it as we do value your feedback.  If you have Giv.tohart, you can expect to receive results automatically within 24 hours of their completion.  Your provider will send a note interpreting your results as well.   If you do not have MyChart, you should receive your results in about a week by mail.    Your care team:                            Family Medicine Internal Medicine   MD Mick Payan MD Shantel Branch-Fleming, MD Srinivasa Vaka, MD Katya Georgiev PA-C Megan Hill, APRFILOMENA Messer MD Pediatrics   Be Vicente, PALINO Fan, CNP MD Doretha Thomson APRN CNP   MD Corazon Mccrary MD Deborah Mielke, MD Kim Thein, APRN  MORIS Pinto, MD Keturah Donahue MD Angela Wermerskirchen, MD      Clinic hours: Monday - Thursday 7 am-7 pm; Fridays 7 am-5 pm.   Urgent care: Monday - Friday 11 am-9 pm; Saturday and Sunday 9 am-5 pm.    Clinic: [phone]       [facility]: Monday - Thursday 8 am - 7 pm; Friday 8 am - 6 pm  [facility]: [phone]     Use www.[email or web address] for 24/7 diagnosis and treatment of dozens of conditions.        Return in about 1 week (around 11/24/2020), or if symptoms worsen or fail to improve, for in person.    Alexandra House PA-C  [facility]

## 2020-11-17 NOTE — TELEPHONE ENCOUNTER
Forms completed. Please fax (add tax id number) and stat scan into record.  Off work 11/9/2020 until 11/23/2020 with limited duty 11/23/2020 until 12/28/2020 no lifting more than 5 pounds. No push/pulling over 5 pounds

## 2020-12-03 ENCOUNTER — OFFICE VISIT (OUTPATIENT)
Dept: FAMILY MEDICINE | Facility: CLINIC | Age: 55
End: 2020-12-03
Payer: COMMERCIAL

## 2020-12-03 ENCOUNTER — TELEPHONE (OUTPATIENT)
Dept: FAMILY MEDICINE | Facility: CLINIC | Age: 55
End: 2020-12-03

## 2020-12-03 VITALS
WEIGHT: 197 LBS | HEIGHT: 69 IN | DIASTOLIC BLOOD PRESSURE: 88 MMHG | BODY MASS INDEX: 29.18 KG/M2 | OXYGEN SATURATION: 97 % | RESPIRATION RATE: 18 BRPM | TEMPERATURE: 97.7 F | HEART RATE: 89 BPM | SYSTOLIC BLOOD PRESSURE: 133 MMHG

## 2020-12-03 DIAGNOSIS — E78.5 HYPERLIPIDEMIA LDL GOAL <100: ICD-10-CM

## 2020-12-03 DIAGNOSIS — I10 HYPERTENSION GOAL BP (BLOOD PRESSURE) < 140/90: ICD-10-CM

## 2020-12-03 LAB
ALBUMIN UR-MCNC: NEGATIVE MG/DL
ANION GAP SERPL CALCULATED.3IONS-SCNC: 4 MMOL/L (ref 3–14)
APPEARANCE UR: CLEAR
BILIRUB UR QL STRIP: NEGATIVE
BUN SERPL-MCNC: 15 MG/DL (ref 7–30)
CALCIUM SERPL-MCNC: 9 MG/DL (ref 8.5–10.1)
CHLORIDE SERPL-SCNC: 106 MMOL/L (ref 94–109)
CHOLEST SERPL-MCNC: 233 MG/DL
CO2 SERPL-SCNC: 28 MMOL/L (ref 20–32)
COLOR UR AUTO: YELLOW
CREAT SERPL-MCNC: 0.81 MG/DL (ref 0.66–1.25)
GFR SERPL CREATININE-BSD FRML MDRD: >90 ML/MIN/{1.73_M2}
GLUCOSE SERPL-MCNC: 199 MG/DL (ref 70–99)
GLUCOSE UR STRIP-MCNC: 250 MG/DL
HDLC SERPL-MCNC: 33 MG/DL
HGB UR QL STRIP: NEGATIVE
KETONES UR STRIP-MCNC: NEGATIVE MG/DL
LDLC SERPL CALC-MCNC: 155 MG/DL
LEUKOCYTE ESTERASE UR QL STRIP: NEGATIVE
NITRATE UR QL: NEGATIVE
NONHDLC SERPL-MCNC: 200 MG/DL
PH UR STRIP: 6 PH (ref 5–7)
POTASSIUM SERPL-SCNC: 3.7 MMOL/L (ref 3.4–5.3)
SODIUM SERPL-SCNC: 138 MMOL/L (ref 133–144)
SOURCE: ABNORMAL
SP GR UR STRIP: 1.02 (ref 1–1.03)
TRIGL SERPL-MCNC: 226 MG/DL
UROBILINOGEN UR STRIP-ACNC: 0.2 EU/DL (ref 0.2–1)

## 2020-12-03 PROCEDURE — 99214 OFFICE O/P EST MOD 30 MIN: CPT | Performed by: INTERNAL MEDICINE

## 2020-12-03 PROCEDURE — 80048 BASIC METABOLIC PNL TOTAL CA: CPT | Performed by: INTERNAL MEDICINE

## 2020-12-03 PROCEDURE — 80061 LIPID PANEL: CPT | Performed by: INTERNAL MEDICINE

## 2020-12-03 PROCEDURE — 36415 COLL VENOUS BLD VENIPUNCTURE: CPT | Performed by: INTERNAL MEDICINE

## 2020-12-03 PROCEDURE — 81003 URINALYSIS AUTO W/O SCOPE: CPT | Performed by: INTERNAL MEDICINE

## 2020-12-03 RX ORDER — GLIPIZIDE 5 MG/1
TABLET ORAL
Qty: 180 TABLET | Refills: 1 | Status: CANCELLED | OUTPATIENT
Start: 2020-12-03

## 2020-12-03 RX ORDER — ATORVASTATIN CALCIUM 40 MG/1
40 TABLET, FILM COATED ORAL DAILY
Qty: 90 TABLET | Refills: 1 | Status: CANCELLED | OUTPATIENT
Start: 2020-12-03

## 2020-12-03 RX ORDER — SILDENAFIL 50 MG/1
50 TABLET, FILM COATED ORAL DAILY PRN
Qty: 12 TABLET | Refills: 5 | Status: CANCELLED | OUTPATIENT
Start: 2020-12-03

## 2020-12-03 RX ORDER — GLIPIZIDE 5 MG/1
10 TABLET ORAL
Qty: 270 TABLET | Refills: 1 | Status: SHIPPED | OUTPATIENT
Start: 2020-12-03 | End: 2021-09-13

## 2020-12-03 RX ORDER — LISINOPRIL 20 MG/1
20 TABLET ORAL DAILY
Qty: 90 TABLET | Refills: 1 | Status: SHIPPED | OUTPATIENT
Start: 2020-12-03 | End: 2021-09-13

## 2020-12-03 RX ORDER — ATORVASTATIN CALCIUM 40 MG/1
40 TABLET, FILM COATED ORAL DAILY
Qty: 90 TABLET | Refills: 1 | Status: SHIPPED | OUTPATIENT
Start: 2020-12-03 | End: 2021-09-13

## 2020-12-03 RX ORDER — LISINOPRIL 20 MG/1
20 TABLET ORAL DAILY
Qty: 90 TABLET | Refills: 1 | Status: CANCELLED | OUTPATIENT
Start: 2020-12-03

## 2020-12-03 ASSESSMENT — PAIN SCALES - GENERAL: PAINLEVEL: MILD PAIN (2)

## 2020-12-03 ASSESSMENT — MIFFLIN-ST. JEOR: SCORE: 1708.97

## 2020-12-03 NOTE — PROGRESS NOTES
Subjective     Samson Camarillo is a 55 year old male who presents to clinic today for the following health issues:    HPI         Diabetes Follow-up      How often are you checking your blood sugar? Not at all    What concerns do you have today about your diabetes? None     Do you have any of these symptoms? (Select all that apply)  Numbness in feet    Have you had a diabetic eye exam in the last 12 months? No          Hyperlipidemia Follow-Up      Are you regularly taking any medication or supplement to lower your cholesterol?   No    Are you having muscle aches or other side effects that you think could be caused by your cholesterol lowering medication?  No    Hypertension Follow-up      Do you check your blood pressure regularly outside of the clinic? Yes     Are you following a low salt diet? No    Are your blood pressures ever more than 140 on the top number (systolic) OR more   than 90 on the bottom number (diastolic), for example 140/90? No    BP Readings from Last 2 Encounters:   12/03/20 133/88   11/17/20 135/82     Hemoglobin A1C (%)   Date Value   08/20/2020 9.3 (H)   07/15/2019 7.7 (H)     LDL Cholesterol Calculated (mg/dL)   Date Value   07/15/2019 69   04/26/2018 66         How many servings of fruits and vegetables do you eat daily?  0-1    On average, how many sweetened beverages do you drink each day (Examples: soda, juice, sweet tea, etc.  Do NOT count diet or artificially sweetened beverages)?   0    How many days per week do you exercise enough to make your heart beat faster? 3 or less    How many minutes a day do you exercise enough to make your heart beat faster? 30 - 60  How many days per week do you miss taking your medication? 2    What makes it hard for you to take your medications?  remembering to take    Review of Systems   CONSTITUTIONAL: NEGATIVE for fever, chills, change in weight  INTEGUMENTARY/SKIN: NEGATIVE for worrisome rashes, moles or lesions  EYES: NEGATIVE for vision changes or  "irritation  ENT/MOUTH: NEGATIVE for ear, mouth and throat problems  RESP: NEGATIVE for significant cough or SOB  CV: NEGATIVE for chest pain, palpitations or peripheral edema  GI: NEGATIVE for nausea, abdominal pain, heartburn, or change in bowel habits  : NEGATIVE for frequency, dysuria, or hematuria  MUSCULOSKELETAL: NEGATIVE for significant arthralgias or myalgia  NEURO: NEGATIVE for weakness, dizziness or paresthesias  ENDOCRINE: NEGATIVE for temperature intolerance, skin/hair changes  HEME: NEGATIVE for bleeding problems  PSYCHIATRIC: NEGATIVE for changes in mood or affect      Objective    /88 (BP Location: Right arm, Patient Position: Chair, Cuff Size: Adult Large)   Pulse 89   Temp 97.7  F (36.5  C) (Oral)   Resp 18   Ht 1.753 m (5' 9\")   Wt 89.4 kg (197 lb)   SpO2 97%   BMI 29.09 kg/m    Body mass index is 29.09 kg/m .  Physical Exam   GENERAL: healthy, alert and no distress  EYES: Eyes grossly normal to inspection, PERRL and conjunctivae and sclerae normal  HENT: ear canals and TM's normal, nose and mouth without ulcers or lesions  NECK: no adenopathy, no asymmetry, masses, or scars and thyroid normal to palpation  RESP: lungs clear to auscultation - no rales, rhonchi or wheezes  CV: regular rate and rhythm, normal S1 S2, no S3 or S4, no murmur, click or rub, no peripheral edema and peripheral pulses strong  ABDOMEN: soft, nontender, no hepatosplenomegaly, no masses and bowel sounds normal  MS: no gross musculoskeletal defects noted, no edema  SKIN: no suspicious lesions or rashes  NEURO: Normal strength and tone, mentation intact and speech normal  PSYCH: mentation appears normal, affect normal/bright    Results for orders placed or performed in visit on 12/03/20   Basic metabolic panel  (Ca, Cl, CO2, Creat, Gluc, K, Na, BUN)     Status: Abnormal   Result Value Ref Range    Sodium 138 133 - 144 mmol/L    Potassium 3.7 3.4 - 5.3 mmol/L    Chloride 106 94 - 109 mmol/L    Carbon Dioxide 28 20 " - 32 mmol/L    Anion Gap 4 3 - 14 mmol/L    Glucose 199 (H) 70 - 99 mg/dL    Urea Nitrogen 15 7 - 30 mg/dL    Creatinine 0.81 0.66 - 1.25 mg/dL    GFR Estimate >90 >60 mL/min/[1.73_m2]    GFR Estimate If Black >90 >60 mL/min/[1.73_m2]    Calcium 9.0 8.5 - 10.1 mg/dL   Lipid panel reflex to direct LDL Non-fasting     Status: Abnormal   Result Value Ref Range    Cholesterol 233 (H) <200 mg/dL    Triglycerides 226 (H) <150 mg/dL    HDL Cholesterol 33 (L) >39 mg/dL    LDL Cholesterol Calculated 155 (H) <100 mg/dL    Non HDL Cholesterol 200 (H) <130 mg/dL   *UA reflex to Microscopic     Status: Abnormal   Result Value Ref Range    Color Urine Yellow     Appearance Urine Clear     Glucose Urine 250 (A) NEG^Negative mg/dL    Bilirubin Urine Negative NEG^Negative    Ketones Urine Negative NEG^Negative mg/dL    Specific Gravity Urine 1.020 1.003 - 1.035    Blood Urine Negative NEG^Negative    pH Urine 6.0 5.0 - 7.0 pH    Protein Albumin Urine Negative NEG^Negative mg/dL    Urobilinogen Urine 0.2 0.2 - 1.0 EU/dL    Nitrite Urine Negative NEG^Negative    Leukocyte Esterase Urine Negative NEG^Negative    Source Midstream Urine            Assessment & Plan   1. Uncontrolled type 2 diabetes with neuropathy (H)  - Hemoglobin A1c; Standing  - Basic metabolic panel  (Ca, Cl, CO2, Creat, Gluc, K, Na, BUN)  - *UA reflex to Microscopic  - blood glucose (NO BRAND SPECIFIED) lancets standard; Use to test blood sugar 2 times daily.  Dispense: 60 each; Refill: 11  - blood glucose (NO BRAND SPECIFIED) test strip; Use to test blood sugar 2 times daily  Dispense: 60 strip; Refill: 11  - blood glucose monitoring (NO BRAND SPECIFIED) meter device kit; Use to test blood sugar 2 times daily.  Dispense: 1 kit; Refill: 0  - metFORMIN (GLUCOPHAGE) 1000 MG tablet; TAKE 1 TABLET BY MOUTH TWICE DAILY WITH MEALS  Dispense: 180 tablet; Refill: 1  - glipiZIDE (GLUCOTROL) 5 MG tablet; Take 2 tablets (10 mg) by mouth daily (with breakfast) And take 1 tablet  "with supper.  Dispense: 270 tablet; Refill: 1  - Hemoglobin A1c; Standing    2. Hypertension goal BP (blood pressure) < 140/90  - lisinopril (ZESTRIL) 20 MG tablet; Take 1 tablet (20 mg) by mouth daily  Dispense: 90 tablet; Refill: 1    3. Hyperlipidemia LDL goal <100  - Lipid panel reflex to direct LDL Non-fasting  - atorvastatin (LIPITOR) 40 MG tablet; Take 1 tablet (40 mg) by mouth daily  Dispense: 90 tablet; Refill: 1     BMI:   Estimated body mass index is 29.09 kg/m  as calculated from the following:    Height as of this encounter: 1.753 m (5' 9\").    Weight as of this encounter: 89.4 kg (197 lb).   Weight management plan: diet and exercise.       Return in about 6 months (around 6/3/2021).    Mick Mariano MD  [facility]    "

## 2020-12-03 NOTE — LETTER
December 16, 2020        Samson Camarillo  [id number] [address] [id number]        Dear Samson,    Recently at your last office visit on 12/3/2020 with Dr Mariano, we ordered some blood work but we missed drawing blood for the Hemoglobin A1C check. Dr Mariano said we can wait until your follow up in a few months and check it at that time is okay.    If further questions, feel free reach the clinic at [phone].    Sincerely,        [facility]

## 2020-12-03 NOTE — TELEPHONE ENCOUNTER
"A1C ordered today 12/3/2020 as \"to be released\" and was not noticed and released with blood draw today. Dr Mariano informed and states we can do in a few months when patient due to return. Left message for patient to return call to let him know it was not done.    Route to 1st floor MA team or [facility] 3 MA Pool when patient returns call.    Adarsh Beyer CMA    "

## 2020-12-08 NOTE — TELEPHONE ENCOUNTER
I have attempted to contact this patient by phone with the following results: left message to return my call on voicemail.  Edwige Bridges CMA([facility])

## 2020-12-14 ENCOUNTER — TELEPHONE (OUTPATIENT)
Dept: FAMILY MEDICINE | Facility: CLINIC | Age: 55
End: 2020-12-14

## 2020-12-14 NOTE — TELEPHONE ENCOUNTER
"This writer attempted to contact Samson on 12/14/20      Reason for call results and left message.      If patient calls back:   Registered Nurse called. Follow Triage Call workflow        Prisca Braxton RN       Urinalysis still shows glucosuria, associated with random glucose of 226.  Take Glipizide 10 mg every morning with breakfast Glipizide 5 mg every supper.     Kidney function is normal.  Electrolytes (\"minerals\" such as potassium, chloride, sodium and calcium) are normal.     Lipid panel is worse.  Take Atorvastatin as scheduled and do not discontinue it.     Repeat lipid panel and diabetes tests in 3 - 6 months followed by a clinic appointment.     (Call patient)     Lipid panel shows  "

## 2020-12-15 NOTE — TELEPHONE ENCOUNTER
This writer attempted to contact patient on 12/15/20      Reason for call inform  Vocal message below and left message.      If patient calls back:   1st floor [place] Care Team (MA/TC) called. Inform patient that someone from the team will contact them, document that pt called and route to care team.         Aurora Escobedo MA

## 2020-12-15 NOTE — TELEPHONE ENCOUNTER
This writer attempted to contact Samson  on 12/15/20      Reason for call lab results and left message.      If patient calls back:   Registered Nurse called. Follow Triage Call workflow        Dave Castellanos RN, BSN, PHN

## 2020-12-15 NOTE — TELEPHONE ENCOUNTER
Patient returned call to clinic.  Relayed all results, as noted below per provider.   Reviewed medication changes and instructions. Advised for patient to follow up in 3-6 months with PCP, have lab-only visit first, about 1 week prior to seeing provider.  Labs already ordered in Epic as standing orders.  Patient agreed with all plans and recommendations, no questions at this time.    Nyla Casanova RN  [facility]

## 2020-12-16 NOTE — TELEPHONE ENCOUNTER
This writer attempted to contact Samson on 12/16/20    Reason for call lab order and left message to return call regarding lab.    When patient calls back, please Relay message below from provider, (read verbatim) .      Letter sent to patient.  Adarsh Beyer

## 2021-01-25 ENCOUNTER — TELEPHONE (OUTPATIENT)
Dept: FAMILY MEDICINE | Facility: CLINIC | Age: 56
End: 2021-01-25

## 2021-01-25 DIAGNOSIS — N52.8 OTHER MALE ERECTILE DYSFUNCTION: Primary | ICD-10-CM

## 2021-01-25 NOTE — TELEPHONE ENCOUNTER
Patient states that if his prescription for sildenafil is for the 20mg tablets instead of the 50mg the cost will reduce significantly.  Prescription needs to be for 30 tabs.  I have pended the prescription for MD diggs.  GRACIE Sweet

## 2021-01-25 NOTE — TELEPHONE ENCOUNTER
Monserrat told him that the sildenafil will drop in price significantly if the prescription is for the 20mg tabs with qty of 30.

## 2021-01-26 RX ORDER — SILDENAFIL CITRATE 20 MG/1
20 TABLET ORAL DAILY PRN
Qty: 30 TABLET | Refills: 9 | Status: SHIPPED | OUTPATIENT
Start: 2021-01-26 | End: 2022-02-03

## 2021-01-26 NOTE — TELEPHONE ENCOUNTER
Order History  Outpatient  Date/Time Action Taken User Additional Information   01/25/21 9458 Pend Kelsea Duran RN    01/26/21 1028 Sign Vocal, Mick Rutherford MD    Outpatient Medication Detail     Disp Refills Start End ALAN   sildenafil (REVATIO) 20 MG tablet 30 tablet 9 1/26/2021  --   Sig - Route: Take 1 tablet (20 mg) by mouth daily as needed (erectile dysfunction) - Oral   Sent to pharmacy as: Sildenafil Citrate 20 MG Oral Tablet (REVATIO)   Class: E-Prescribe   Order: [id number]   E-Prescribing Status: Receipt confirmed by pharmacy (1/26/2021 10:29 AM CST)   Printout Tracking    External Result Report   Pharmacy    [id number] - [place] - [id number]   Associated Diagnoses    Other male erectile dysfunction [N52.8]  - Primary

## 2022-01-28 ENCOUNTER — TELEPHONE (OUTPATIENT)
Dept: FAMILY MEDICINE | Facility: CLINIC | Age: 57
End: 2022-01-28
Payer: COMMERCIAL

## 2022-01-28 NOTE — TELEPHONE ENCOUNTER
Patient calling regarding a refill of his Metformin.   Patient reports he had an apt with PCP for 1/6/22 but had to reschedule, soonest he could get in with a provider is 2/14/22. Pt is completely out of Metformin but pharmacy will not allow for ariadna refill unless authorized by PCP.    To provider to review & advise.     Carmen Batres RN, BSN  [facility]

## 2022-01-28 NOTE — TELEPHONE ENCOUNTER
Insurance only wants 90 days supply for metFORMIN (GLUCOPHAGE) 1000 MG tablet.            Titi Faarax  [facility]

## 2022-01-28 NOTE — TELEPHONE ENCOUNTER
Inform patient that he is scheduled for an appointment with this provider on February 3, 2022 at 2:00 PM.

## 2022-02-03 ENCOUNTER — OFFICE VISIT (OUTPATIENT)
Dept: FAMILY MEDICINE | Facility: CLINIC | Age: 57
End: 2022-02-03
Payer: COMMERCIAL

## 2022-02-03 DIAGNOSIS — I10 HYPERTENSION GOAL BP (BLOOD PRESSURE) < 140/90: ICD-10-CM

## 2022-02-03 DIAGNOSIS — Z13.89 SCREENING FOR DIABETIC PERIPHERAL NEUROPATHY: ICD-10-CM

## 2022-02-03 DIAGNOSIS — Z12.2 SCREENING FOR LUNG CANCER: ICD-10-CM

## 2022-02-03 DIAGNOSIS — N52.8 OTHER MALE ERECTILE DYSFUNCTION: ICD-10-CM

## 2022-02-03 DIAGNOSIS — E78.5 HYPERLIPIDEMIA LDL GOAL <100: ICD-10-CM

## 2022-02-03 DIAGNOSIS — Z00.00 ROUTINE GENERAL MEDICAL EXAMINATION AT A HEALTH CARE FACILITY: Primary | ICD-10-CM

## 2022-02-03 DIAGNOSIS — Z12.11 SCREEN FOR COLON CANCER: ICD-10-CM

## 2022-02-03 LAB
ANION GAP SERPL CALCULATED.3IONS-SCNC: 9 MMOL/L (ref 3–14)
BUN SERPL-MCNC: 14 MG/DL (ref 7–30)
CALCIUM SERPL-MCNC: 9.4 MG/DL (ref 8.5–10.1)
CHLORIDE BLD-SCNC: 101 MMOL/L (ref 94–109)
CHOLEST SERPL-MCNC: 151 MG/DL
CO2 SERPL-SCNC: 25 MMOL/L (ref 20–32)
CREAT SERPL-MCNC: 0.86 MG/DL (ref 0.66–1.25)
CREAT UR-MCNC: 45 MG/DL
FASTING STATUS PATIENT QL REPORTED: NO
GFR SERPL CREATININE-BSD FRML MDRD: >90 ML/MIN/1.73M2
GLUCOSE BLD-MCNC: 372 MG/DL (ref 70–99)
HBA1C MFR BLD: 11.3 % (ref 0–5.6)
HDLC SERPL-MCNC: 35 MG/DL
LDLC SERPL CALC-MCNC: 67 MG/DL
MICROALBUMIN UR-MCNC: <5 MG/L
MICROALBUMIN/CREAT UR: NORMAL MG/G{CREAT}
NONHDLC SERPL-MCNC: 116 MG/DL
POTASSIUM BLD-SCNC: 3.7 MMOL/L (ref 3.4–5.3)
SODIUM SERPL-SCNC: 135 MMOL/L (ref 133–144)
TRIGL SERPL-MCNC: 246 MG/DL

## 2022-02-03 PROCEDURE — 83036 HEMOGLOBIN GLYCOSYLATED A1C: CPT | Performed by: INTERNAL MEDICINE

## 2022-02-03 PROCEDURE — 80061 LIPID PANEL: CPT | Performed by: INTERNAL MEDICINE

## 2022-02-03 PROCEDURE — 82043 UR ALBUMIN QUANTITATIVE: CPT | Performed by: INTERNAL MEDICINE

## 2022-02-03 PROCEDURE — 80048 BASIC METABOLIC PNL TOTAL CA: CPT | Performed by: INTERNAL MEDICINE

## 2022-02-03 PROCEDURE — 86706 HEP B SURFACE ANTIBODY: CPT | Performed by: INTERNAL MEDICINE

## 2022-02-03 PROCEDURE — G0103 PSA SCREENING: HCPCS | Performed by: INTERNAL MEDICINE

## 2022-02-03 PROCEDURE — 36415 COLL VENOUS BLD VENIPUNCTURE: CPT | Performed by: INTERNAL MEDICINE

## 2022-02-03 PROCEDURE — 99207 PR FOOT EXAM NO CHARGE: CPT | Mod: 25 | Performed by: INTERNAL MEDICINE

## 2022-02-03 PROCEDURE — 99396 PREV VISIT EST AGE 40-64: CPT | Performed by: INTERNAL MEDICINE

## 2022-02-03 RX ORDER — TADALAFIL 10 MG/1
10 TABLET ORAL DAILY PRN
Qty: 30 TABLET | Refills: 11 | Status: SHIPPED | OUTPATIENT
Start: 2022-02-03 | End: 2024-10-03

## 2022-02-03 RX ORDER — ATORVASTATIN CALCIUM 40 MG/1
40 TABLET, FILM COATED ORAL DAILY
Qty: 90 TABLET | Refills: 3 | Status: SHIPPED | OUTPATIENT
Start: 2022-02-03 | End: 2023-02-10

## 2022-02-03 RX ORDER — LISINOPRIL 20 MG/1
20 TABLET ORAL DAILY
Qty: 90 TABLET | Refills: 1 | Status: SHIPPED | OUTPATIENT
Start: 2022-02-03 | End: 2022-09-19

## 2022-02-03 RX ORDER — GLIPIZIDE 5 MG/1
5 TABLET ORAL
Qty: 180 TABLET | Refills: 3 | Status: SHIPPED | OUTPATIENT
Start: 2022-02-03 | End: 2023-02-10

## 2022-02-03 ASSESSMENT — ENCOUNTER SYMPTOMS
HEMATURIA: 0
COUGH: 0
EYE PAIN: 0
WEAKNESS: 1
CHILLS: 0
PALPITATIONS: 0
MYALGIAS: 0
FEVER: 0
DYSURIA: 0
SORE THROAT: 0
FREQUENCY: 1
NERVOUS/ANXIOUS: 0
NAUSEA: 1
HEADACHES: 0
DIARRHEA: 1
ABDOMINAL PAIN: 0
ARTHRALGIAS: 1
DIZZINESS: 0
SHORTNESS OF BREATH: 0
HEMATOCHEZIA: 0
HEARTBURN: 0
PARESTHESIAS: 0
JOINT SWELLING: 0

## 2022-02-03 ASSESSMENT — MIFFLIN-ST. JEOR: SCORE: 1687.64

## 2022-02-03 ASSESSMENT — PAIN SCALES - GENERAL: PAINLEVEL: NO PAIN (0)

## 2022-02-03 NOTE — PROGRESS NOTES
SUBJECTIVE:   CC: Samson Camarillo is an 56 year old male who presents for preventative health visit.       Patient has been advised of split billing requirements and indicates understanding: Yes  Healthy Habits:     Getting at least 3 servings of Calcium per day:  Yes    Bi-annual eye exam:  Yes    Dental care twice a year:  Yes    Sleep apnea or symptoms of sleep apnea:  None    Diet:  Low salt, Low fat/cholesterol, Diabetic and Vegetarian/vegan    Frequency of exercise:  4-5 days/week    Duration of exercise:  Less than 15 minutes    Taking medications regularly:  Yes    Medication side effects:  None    PHQ-2 Total Score: 0    Additional concerns today:  No          Today's PHQ-2 Score:   PHQ-2 (  [employer]) 2/3/2022   Q1: Little interest or pleasure in doing things 0   Q2: Feeling down, depressed or hopeless 0   PHQ-2 Score 0   PHQ-2 Total Score (12-17 Years)- Positive if 3 or more points; Administer PHQ-A if positive -   Q1: Little interest or pleasure in doing things Not at all   Q2: Feeling down, depressed or hopeless Not at all   PHQ-2 Score 0       Abuse: Current or Past(Physical, Sexual or Emotional)- No- rather not answer  Do you feel safe in your environment? Yes    Have you ever done Advance Care Planning? (For example, a Health Directive, POLST, or a discussion with a medical provider or your loved ones about your wishes): No, advance care planning information given to patient to review.  Patient plans to discuss their wishes with loved ones or provider.  - patient requested more information. Handout given.     Social History     Tobacco Use     Smoking status: Former Smoker     Types: Cigarettes     Quit date: 2019     Years since quittin.8     Smokeless tobacco: Never Used     Tobacco comment: 1 pack per week   Substance Use Topics     Alcohol use: Yes     Alcohol/week: 0.0 standard drinks     Comment: 1 drink per month     If you drink alcohol do you typically have >3 drinks per day or >7  drinks per week? No    Alcohol Use 2/3/2022   Prescreen: >3 drinks/day or >7 drinks/week? Not Applicable   Prescreen: >3 drinks/day or >7 drinks/week? -   No flowsheet data found.    Last PSA: No results found for: PSA    Reviewed orders with patient. Reviewed health maintenance and updated orders accordingly - No  Labs reviewed in EPIC  BP Readings from Last 3 Encounters:   22 (!) 148/84   20 133/88   20 135/82    Wt Readings from Last 3 Encounters:   22 87.7 kg (193 lb 6.4 oz)   20 89.4 kg (197 lb)   20 89.8 kg (198 lb)                  Patient Active Problem List   Diagnosis     Anxiety     Psoriasis     HYPERLIPIDEMIA LDL GOAL <100     Erectile dysfunction     Hypertension goal BP (blood pressure) < 140/90     Overweight (BMI 25.0-29.9)     Uncontrolled type 2 diabetes mellitus with microalbuminuria (H)     Past Surgical History:   Procedure Laterality Date     COLONOSCOPY N/A 6/15/2015    Procedure: COLONOSCOPY;  Surgeon: Jonnie Dwyer MD;  Location: [facility] OR     COLONOSCOPY WITH CO2 INSUFFLATION N/A 6/15/2015    Procedure: COLONOSCOPY WITH CO2 INSUFFLATION;  Surgeon: Jonnie Dwyer MD;  Location: [facility] OR     ECHO STRESS TEST       ENDOSCOPIC SINUS SURGERY  2015    right maxillary sinus     SINUS SURGERY Bilateral 16    Dr. Quesada     SINUS SURGERY  2016     ZZC AFF PREP FACE/ORAL PROST UNLISTED         Social History     Tobacco Use     Smoking status: Former Smoker     Types: Cigarettes     Quit date: 2019     Years since quittin.8     Smokeless tobacco: Never Used     Tobacco comment: 1 pack per week   Substance Use Topics     Alcohol use: Yes     Alcohol/week: 0.0 standard drinks     Comment: 1 drink per month     Family History   Problem Relation Age of Onset     Hypertension Mother      Heart Disease Mother      Thyroid Disease Mother      Kidney Disease Mother         stage 2 to 3     Diabetes Father      Hypertension Father       Cerebrovascular Disease Father      Diabetes Maternal Grandfather      Cancer Paternal Grandmother      Heart Disease Paternal Grandfather          Allergies   Allergen Reactions     Jardiance [Empagliflozin]      Causes cystitis.     Recent Labs   Lab Test 02/03/22  1514 12/03/20  1616 08/20/20  1438 08/01/19  1132 07/15/19  0932 04/15/19  1909 04/26/18  1717 04/04/17  1801   A1C 11.3*  --  9.3*  --  7.7* 9.0*   < > 11.4*   LDL 67 155*  --   --  69  --    < > 134*   HDL 35* 33*  --   --  34*  --    < > 38*   TRIG 246* 226*  --   --  74  --    < > 247*   ALT  --   --  36 27  --  57   < > 44   CR 0.86 0.81 1.02 1.12  --  0.72   < > 0.84   GFRESTIMATED >90 >90 81 73  --  >90   < > >90  Non  GFR Calc     GFRESTBLACK  --  >90 >90 85  --  >90   < > >90  African American GFR Calc     POTASSIUM 3.7 3.7 3.9 3.6  --  3.6   < > 3.8   TSH  --   --   --   --   --  1.67  --  1.74    < > = values in this interval not displayed.        Reviewed and updated as needed this visit by clinical staff  Tobacco  Allergies    Med Hx  Surg Hx  Fam Hx  Soc Hx       Reviewed and updated as needed this visit by Provider                   Review of Systems   Constitutional: Negative for chills and fever.   HENT: Negative for congestion, ear pain, hearing loss and sore throat.    Eyes: Negative for pain and visual disturbance.   Respiratory: Negative for cough and shortness of breath.    Cardiovascular: Negative for chest pain, palpitations and peripheral edema.   Gastrointestinal: Positive for diarrhea and nausea. Negative for abdominal pain, heartburn and hematochezia.   Genitourinary: Positive for frequency and impotence. Negative for dysuria, genital sores, hematuria, penile discharge and urgency.   Musculoskeletal: Positive for arthralgias. Negative for joint swelling and myalgias.   Skin: Negative for rash.   Neurological: Positive for weakness. Negative for dizziness, headaches and paresthesias.  "  Psychiatric/Behavioral: Negative for mood changes. The patient is not nervous/anxious.          OBJECTIVE:   /80   Pulse 82   Temp 97.5  F (36.4  C) (Tympanic)   Resp 16   Ht 1.753 m (5' 9\")   Wt 87.7 kg (193 lb 6.4 oz)   SpO2 100%   BMI 28.56 kg/m    Physical Exam  GENERAL: healthy, alert and no distress  EYES: Eyes grossly normal to inspection, PERRL and conjunctivae and sclerae normal  HENT: normal cephalic/atraumatic  RESP: lungs clear to auscultation - no rales, rhonchi or wheezes  CV: regular rate and rhythm, normal S1 S2, no S3 or S4, no murmur, click or rub, no peripheral edema and peripheral pulses strong  ABDOMEN: soft, nontender, no hepatosplenomegaly, no masses and bowel sounds normal  MS: no gross musculoskeletal defects noted, no edema  SKIN: no suspicious lesions or rashes  NEURO: Normal strength and tone, mentation intact and speech normal  PSYCH: mentation appears normal, affect normal/bright  Diabetic foot exam: normal DP and PT pulses, no trophic changes or ulcerative lesions and normal sensory exam    Diagnostic Test Results:  Results for orders placed or performed in visit on 02/03/22   HEMOGLOBIN A1C     Status: Abnormal   Result Value Ref Range    Hemoglobin A1C 11.3 (H) 0.0 - 5.6 %   Albumin Random Urine Quantitative with Creat Ratio     Status: None   Result Value Ref Range    Creatinine Urine mg/dL 45 mg/dL    Albumin Urine mg/L <5 mg/L    Albumin Urine mg/g Cr     BASIC METABOLIC PANEL     Status: Abnormal   Result Value Ref Range    Sodium 135 133 - 144 mmol/L    Potassium 3.7 3.4 - 5.3 mmol/L    Chloride 101 94 - 109 mmol/L    Carbon Dioxide (CO2) 25 20 - 32 mmol/L    Anion Gap 9 3 - 14 mmol/L    Urea Nitrogen 14 7 - 30 mg/dL    Creatinine 0.86 0.66 - 1.25 mg/dL    Calcium 9.4 8.5 - 10.1 mg/dL    Glucose 372 (H) 70 - 99 mg/dL    GFR Estimate >90 >60 mL/min/1.73m2   Lipid panel reflex to direct LDL Non-fasting     Status: Abnormal   Result Value Ref Range    Cholesterol 151 " <200 mg/dL    Triglycerides 246 (H) <150 mg/dL    Direct Measure HDL 35 (L) >=40 mg/dL    LDL Cholesterol Calculated 67 <=100 mg/dL    Non HDL Cholesterol 116 <130 mg/dL    Patient Fasting > 8hrs? No     Narrative    Cholesterol  Desirable:  <200 mg/dL    Triglycerides  Normal:  Less than 150 mg/dL  Borderline High:  150-199 mg/dL  High:  200-499 mg/dL  Very High:  Greater than or equal to 500 mg/dL    Direct Measure HDL  Female:  Greater than or equal to 50 mg/dL   Male:  Greater than or equal to 40 mg/dL    LDL Cholesterol  Desirable:  <100mg/dL  Above Desirable:  100-129 mg/dL   Borderline High:  130-159 mg/dL   High:  160-189 mg/dL   Very High:  >= 190 mg/dL    Non HDL Cholesterol  Desirable:  130 mg/dL  Above Desirable:  130-159 mg/dL  Borderline High:  160-189 mg/dL  High:  190-219 mg/dL  Very High:  Greater than or equal to 220 mg/dL       ASSESSMENT/PLAN:     Routine general medical examination at a health care facility  - BASIC METABOLIC PANEL  - Hepatitis B Surface Antibody  -PSA screen  Hyperlipidemia LDL goal <100  - Lipid panel reflex to direct LDL Non-fasting  - atorvastatin (LIPITOR) 40 MG tablet; Take 1 tablet (40 mg) by mouth daily    Uncontrolled type 2 diabetes mellitus with microalbuminuria (H)  - HEMOGLOBIN A1C  - Albumin Random Urine Quantitative with Creat Ratio  - BASIC METABOLIC PANEL  - glipiZIDE (GLUCOTROL) 5 MG tablet; Take 1 tablet (5 mg) by mouth 2 times daily (before meals)  - metFORMIN (GLUCOPHAGE) 1000 MG tablet; TAKE 1 TABLET BY MOUTH TWICE DAILY WITH MEALS    Hypertension goal BP (blood pressure) < 140/90  - lisinopril (ZESTRIL) 20 MG tablet; Take 1 tablet (20 mg) by mouth daily    Screen for colon cancer  - Adult Gastro Ref - Procedure Only; Future    Screening for lung cancer  - CT Chest Lung Cancer Scrn Low Dose wo; Future    Other male erectile dysfunction  - tadalafil (CIALIS) 10 MG tablet; Take 1 tablet (10 mg) by mouth daily as needed (erectile dysfunction)    Screening for  "diabetic peripheral neuropathy  - FOOT EXAM    Patient has been advised of split billing requirements and indicates understanding: Yes    COUNSELING:   Special attention given to:        Regular exercise       Healthy diet/nutrition       Prostate cancer screening       Consider lung cancer screening for ages 55-80 years and 20 pack-year smoking history         The 10-year ASCVD risk score (Alex REGAN Jr., et al., 2013) is: 17.2%    Values used to calculate the score:      Age: 56 years      Sex: Male      Is Non- : No      Diabetic: Yes      Tobacco smoker: No      Systolic Blood Pressure: 135 mmHg      Is BP treated: Yes      HDL Cholesterol: 35 mg/dL      Total Cholesterol: 151 mg/dL    Estimated body mass index is 28.56 kg/m  as calculated from the following:    Height as of this encounter: 1.753 m (5' 9\").    Weight as of this encounter: 87.7 kg (193 lb 6.4 oz).     Weight management plan: diet and exercise.    He reports that he quit smoking about 2 years ago. His smoking use included cigarettes. He has never used smokeless tobacco.      Counseling Resources:  ATP IV Guidelines  Pooled Cohorts Equation Calculator  FRAX Risk Assessment  ICSI Preventive Guidelines  Dietary Guidelines for Americans, 2010  [employer]'s MyPlate  ASA Prophylaxis  Lung CA Screening    Mick Mariano MD  [facility]    "

## 2022-02-04 VITALS
BODY MASS INDEX: 28.64 KG/M2 | SYSTOLIC BLOOD PRESSURE: 135 MMHG | HEART RATE: 82 BPM | RESPIRATION RATE: 16 BRPM | DIASTOLIC BLOOD PRESSURE: 80 MMHG | OXYGEN SATURATION: 100 % | HEIGHT: 69 IN | TEMPERATURE: 97.5 F | WEIGHT: 193.4 LBS

## 2022-02-04 LAB
HBV SURFACE AB SERPL IA-ACNC: 0.28 M[IU]/ML
PSA SERPL-MCNC: 1.76 UG/L (ref 0–4)

## 2022-02-16 ENCOUNTER — TELEPHONE (OUTPATIENT)
Dept: GASTROENTEROLOGY | Facility: CLINIC | Age: 57
End: 2022-02-16
Payer: COMMERCIAL

## 2022-02-16 NOTE — TELEPHONE ENCOUNTER
Screening Questions  Blue=prep questions Red=location Green=sedation   1. Are you active on mychart? N    What insurance is in the chart?  [employer] [place]  2.     3.  Ordering/Referring Provider:   Mick Mariano MD    4. BMI 28.5, If greater than 40 review exclusion criteria also will need EXTENDED PREP    5.  Respiratory Screening (If yes to any of the following HOSPITAL setting only):     Do you use daily home oxygen? N  Do you have mod to severe Obstructive Sleep Apnea? N (can be seen at [facility] or hospital setting)    Do you have Pulmonary Hypertension? N   Do you have UNCONTROLLED asthma? N    6. Have you had a heart or lung transplant? N  (If yes, please review exclusion criteria)    7. Are you currently on dialysis?N  (If yes, schedule in HOSPITAL setting only)(If yes, please send Golytely prep)    8. Do you have chronic kidney disease? N (If yes, please send Golytely prep)    9. Have you had a stroke or Transient ischemic attack (TIA) within 6 months? N (If yes, do not schedule at [facility])    10. In the past 6 months, have you had any heart related issues including cardiomyopathy or heart attack? N (If yes, please review exclusion criteria)           If yes, did it require cardiac stenting or other implantable device?N  (If yes, please review exclusion criteria)      11. Do you have any implantable devices in your body (pacemaker, defib, LVAD)? N (If yes, schedule at [facility])    12. Do you take nitroglycerin? If yes, how often? N (if yes, schedule at HOSPITAL setting)    13. Are you currently taking any blood thinners?N (If yes- inform patient to follow up with PCP or provider for follow up instructions)     14. Are you a diabetic? N (If yes, please send Golytely prep)    15. (Females) Are you currently pregnant? N  If yes, how many weeks?      16. Are you taking any prescription pain medications on a routine schedule? N If yes, MAC sedation and patient will need EXTENDED PREP.    17. Do you have any chemical  dependencies such as alcohol, street drugs, or methadone? N If yes, MAC sedation     18. Do you have any history of post-traumatic stress syndrome, severe anxiety or history of psychosis? N  If yes, MAC sedation.     19. Do you transfer independently? Y    20.  Do you have any issues with constipation? N   If yes, pt will need EXTENDED PREP     21. Preferred Pharmacy for Pre Prescription [id number]    Scheduling Details    Which Colonoscopy Prep was Sent?: GOLYTELY  Type of Procedure Scheduled: COLONOSCOPY  Surgeon: OCTAVIO  Date of Procedure: 4/11  Location:  OR  Caller (Please ask for phone number if not scheduled by patient): SELF      Sedation Type: CONSCIOUS  Conscious Sedation- Needs  for 6 hours after the procedure  MAC/General-Needs  for 24 hours after procedure    Pre-op Required at [facility], [place], [place] and OR for MAC sedation: N  (if yes advise patient they will need a pre-op prior to procedure)      Informed patient they will need an adult  Y  Cannot take any type of public or medical transportation alone    Pre-Procedure Covid test to be completed at [facility] or Externally: [facility]    Confirmed Nurse will call to complete assessment N    Additional comments: N (DE LUC[facility] PATIENTS NEED EXTENDED PREP)

## 2022-02-26 DIAGNOSIS — Z11.59 ENCOUNTER FOR SCREENING FOR OTHER VIRAL DISEASES: Primary | ICD-10-CM

## 2022-03-31 RX ORDER — BISACODYL 5 MG/1
20 TABLET, DELAYED RELEASE ORAL SEE ADMIN INSTRUCTIONS
Qty: 4 TABLET | Refills: 0 | Status: SHIPPED | OUTPATIENT
Start: 2022-03-31 | End: 2024-10-03

## 2022-04-07 ENCOUNTER — LAB (OUTPATIENT)
Dept: URGENT CARE | Facility: URGENT CARE | Age: 57
End: 2022-04-07
Payer: COMMERCIAL

## 2022-04-07 DIAGNOSIS — Z11.59 ENCOUNTER FOR SCREENING FOR OTHER VIRAL DISEASES: ICD-10-CM

## 2022-04-07 PROCEDURE — U0003 INFECTIOUS AGENT DETECTION BY NUCLEIC ACID (DNA OR RNA); SEVERE ACUTE RESPIRATORY SYNDROME CORONAVIRUS 2 (SARS-COV-2) (CORONAVIRUS DISEASE [COVID-19]), AMPLIFIED PROBE TECHNIQUE, MAKING USE OF HIGH THROUGHPUT TECHNOLOGIES AS DESCRIBED BY CMS-2020-01-R: HCPCS

## 2022-04-07 PROCEDURE — U0005 INFEC AGEN DETEC AMPLI PROBE: HCPCS

## 2022-04-08 LAB — SARS-COV-2 RNA RESP QL NAA+PROBE: NEGATIVE

## 2022-04-11 ENCOUNTER — HOSPITAL ENCOUNTER (OUTPATIENT)
Facility: AMBULATORY SURGERY CENTER | Age: 57
Discharge: HOME | End: 2022-04-11
Attending: INTERNAL MEDICINE | Admitting: INTERNAL MEDICINE
Payer: COMMERCIAL

## 2022-04-11 VITALS
OXYGEN SATURATION: 96 % | SYSTOLIC BLOOD PRESSURE: 135 MMHG | HEART RATE: 96 BPM | TEMPERATURE: 98.6 F | DIASTOLIC BLOOD PRESSURE: 98 MMHG | RESPIRATION RATE: 16 BRPM

## 2022-04-11 DIAGNOSIS — Z12.11 SCREEN FOR COLON CANCER: Primary | ICD-10-CM

## 2022-04-11 LAB
COLONOSCOPY: NORMAL
GLUCOSE BLDC GLUCOMTR-MCNC: 219 MG/DL (ref 70–99)

## 2022-04-11 PROCEDURE — G8918 PT W/O PREOP ORDER IV AB PRO: HCPCS

## 2022-04-11 PROCEDURE — 45385 COLONOSCOPY W/LESION REMOVAL: CPT

## 2022-04-11 PROCEDURE — 82962 GLUCOSE BLOOD TEST: CPT | Performed by: INTERNAL MEDICINE

## 2022-04-11 PROCEDURE — 88305 TISSUE EXAM BY PATHOLOGIST: CPT | Performed by: PATHOLOGY

## 2022-04-11 PROCEDURE — G8907 PT DOC NO EVENTS ON DISCHARG: HCPCS

## 2022-04-11 RX ORDER — LIDOCAINE 40 MG/G
CREAM TOPICAL
Status: DISCONTINUED | OUTPATIENT
Start: 2022-04-11 | End: 2022-04-12 | Stop reason: HOSPADM

## 2022-04-11 RX ORDER — NALOXONE HYDROCHLORIDE 0.4 MG/ML
0.4 INJECTION, SOLUTION INTRAMUSCULAR; INTRAVENOUS; SUBCUTANEOUS
Status: DISCONTINUED | OUTPATIENT
Start: 2022-04-11 | End: 2022-04-12 | Stop reason: HOSPADM

## 2022-04-11 RX ORDER — PROCHLORPERAZINE MALEATE 10 MG
10 TABLET ORAL EVERY 6 HOURS PRN
Status: DISCONTINUED | OUTPATIENT
Start: 2022-04-11 | End: 2022-04-12 | Stop reason: HOSPADM

## 2022-04-11 RX ORDER — FENTANYL CITRATE 50 UG/ML
INJECTION, SOLUTION INTRAMUSCULAR; INTRAVENOUS PRN
Status: DISCONTINUED | OUTPATIENT
Start: 2022-04-11 | End: 2022-04-11 | Stop reason: HOSPADM

## 2022-04-11 RX ORDER — NALOXONE HYDROCHLORIDE 0.4 MG/ML
0.2 INJECTION, SOLUTION INTRAMUSCULAR; INTRAVENOUS; SUBCUTANEOUS
Status: DISCONTINUED | OUTPATIENT
Start: 2022-04-11 | End: 2022-04-12 | Stop reason: HOSPADM

## 2022-04-11 RX ORDER — ONDANSETRON 2 MG/ML
4 INJECTION INTRAMUSCULAR; INTRAVENOUS EVERY 6 HOURS PRN
Status: DISCONTINUED | OUTPATIENT
Start: 2022-04-11 | End: 2022-04-12 | Stop reason: HOSPADM

## 2022-04-11 RX ORDER — ONDANSETRON 2 MG/ML
4 INJECTION INTRAMUSCULAR; INTRAVENOUS
Status: DISCONTINUED | OUTPATIENT
Start: 2022-04-11 | End: 2022-04-12 | Stop reason: HOSPADM

## 2022-04-11 RX ORDER — FLUMAZENIL 0.1 MG/ML
0.2 INJECTION, SOLUTION INTRAVENOUS
Status: DISCONTINUED | OUTPATIENT
Start: 2022-04-11 | End: 2022-04-12 | Stop reason: HOSPADM

## 2022-04-11 RX ORDER — MULTIVITAMIN,THERAPEUTIC
1 TABLET ORAL DAILY
COMMUNITY

## 2022-04-11 RX ORDER — ONDANSETRON 4 MG/1
4 TABLET, ORALLY DISINTEGRATING ORAL EVERY 6 HOURS PRN
Status: DISCONTINUED | OUTPATIENT
Start: 2022-04-11 | End: 2022-04-12 | Stop reason: HOSPADM

## 2022-04-11 NOTE — H&P
ENDOSCOPY PRE-SEDATION H&P FOR OUTPATIENT PROCEDURES    Samson Camarillo  [id number]  1965    Procedure: colonoscopy    Pre-procedure diagnosis: screening    Past medical history:   Past Medical History:   Diagnosis Date     Anxiety      Chemical dependency (H)      Colon polyps 6/15     Diabetes (H)      Elevated blood sugar      Erectile dysfunction      History of chest pain 2010     Hyperlipaemia      Hypertension      Psoriasis      Uncontrolled type 2 diabetes with neuropathy (H) 4/9/2017       Past surgical history:   Past Surgical History:   Procedure Laterality Date     COLONOSCOPY N/A 6/15/2015    Procedure: COLONOSCOPY;  Surgeon: Jonnie Dwyer MD;  Location: [facility] OR     COLONOSCOPY WITH CO2 INSUFFLATION N/A 6/15/2015    Procedure: COLONOSCOPY WITH CO2 INSUFFLATION;  Surgeon: Jonnie Dwyer MD;  Location: [facility] OR     ECHO STRESS TEST  2010     ENDOSCOPIC SINUS SURGERY  2/2015    right maxillary sinus     SINUS SURGERY Bilateral 2-18-16    Dr. Quesada     SINUS SURGERY  01/2016     [facility] AFF PREP FACE/ORAL PROST UNLISTED         Current Outpatient Medications   Medication     aspirin 81 MG tablet     atorvastatin (LIPITOR) 40 MG tablet     bisacodyl (DULCOLAX) 5 MG EC tablet     glipiZIDE (GLUCOTROL) 5 MG tablet     lisinopril (ZESTRIL) 20 MG tablet     metFORMIN (GLUCOPHAGE) 1000 MG tablet     multivitamin, therapeutic (THERA-VIT) TABS tablet     polyethylene glycol (GOLYTELY) 236 g suspension     Simethicone 125 MG TABS     blood glucose (NO BRAND SPECIFIED) lancets standard     blood glucose (NO BRAND SPECIFIED) test strip     blood glucose monitoring (NO BRAND SPECIFIED) meter device kit     ORDER FOR DME     tadalafil (CIALIS) 10 MG tablet     Current Facility-Administered Medications   Medication     fentaNYL (PF) (SUBLIMAZE) injection     lidocaine (LMX4) kit     lidocaine 1 % 0.1-1 mL     midazolam (VERSED) injection     ondansetron (ZOFRAN) injection 4 mg     sodium chloride (PF) 0.9% PF  flush 3 mL     sodium chloride (PF) 0.9% PF flush 3 mL       Allergies   Allergen Reactions     Jardiance [Empagliflozin]      Causes cystitis.     Other Drug Allergy (See Comments)      Liver problems after antifungal for toenails       History of Anesthesia/Sedation Problems: no    Physical Exam:    Mental status: alert  Heart: Normal  Lung: Normal  Assessment of patient's airway: Normal  Other as pertinent for procedure: None     ASA Score: See Provation note    Mallampati score:  II - Faucial pillars and soft palate may be seen, but uvula is masked by the base of the tongue    Assessment/Plan:     The patient is an appropriate candidate to receive sedation.    Informed consent was discussed with the patient/family, including the risks, benefits, potential complications and any alternative options associated with sedation.    Patient assessment completed just prior to sedation and while under constant observation by the provider. Condition determined to be adequate for proceeding with sedation.    The specific risks for the procedure were discussed with the patient at the time of informed consent and include but are not limited to perforation which could require surgery, missing significant neoplasm or lesion, hemorrhage and adverse sedative complication.      Vikash De Paz MD

## 2022-04-11 NOTE — LETTER
"April 14, 2022      Samson Camarillo  [address] [address] [address]        Dear ,    I am writing to let you know the results of the polyp that were removed at the time of your colonoscopy.  The polyp returned as an \"adenomatous polyp\".  An adenoma is a type of polyp that if left in the colon over a long enough period of time, and under the right circumstances, it could develop into a colon cancer.  There was no cancer in your polyp.  Your polyp was completely removed, however you are at risk to grow more adenomatous polyps in the future.      Because of this I recommend that you repeat a colonoscopy to screen for new polyps in seven (7) years.  Please review these findings and recommendations with your primary care provider.  Of course should you develop any symptoms (such as unintended weight loss, blood in your stool or change in bowel pattern), you should let myself or your primary care doctor know as you may need an earlier procedure.      If you have any questions, please don't hesitate to contact the GI care coordinator at [phone].     Vikash De Paz MD  [facility]  Gastroenterology and Hepatology  [facility] Adjunct     Resulted Orders   Surgical Pathology Exam   Result Value Ref Range    Case Report       Surgical Pathology Report                         Case: [id number]                                  Authorizing Provider:  Vikash De Paz      Collected:           04/11/2022 03:29 PM                                 MD Donn                                                                     Ordering Location:     [facility]    Received:            04/11/2022 04:36 PM                                 [facility]                                                                     Pathologist:           Kavon Tirado, DO                                                            Specimen:    Large " "Intestine, Colon, Cecum, Cecal polyp                                                 Final Diagnosis       A(1).  COLON, CECUM, POLYP:  - Tubular adenoma  - No high-grade dysplasia or malignancy identified        Clinical Information       Colon polyp      Gross Description       A(1). Large Intestine, Colon, Cecum, Cecal polyp:  The specimen is received in formalin with proper patient identification, labeled \"cecal polyp\".  The specimen consists of a 0.9 x 0.5 x 0.3 cm tan-white soft tissue fragment.  Submitted in A1.       Performing Labs       The technical component of this testing was completed at [facility]      Case Images         If you have any questions or concerns, please call the clinic at the number listed above.       Sincerely,      Vikash De Paz MD          "

## 2022-04-13 LAB
PATH REPORT.COMMENTS IMP SPEC: NORMAL
PATH REPORT.COMMENTS IMP SPEC: NORMAL
PATH REPORT.FINAL DX SPEC: NORMAL
PATH REPORT.GROSS SPEC: NORMAL
PATH REPORT.RELEVANT HX SPEC: NORMAL
PHOTO IMAGE: NORMAL

## 2022-10-04 PROBLEM — E11.29 TYPE 2 DIABETES MELLITUS WITH OTHER DIABETIC KIDNEY COMPLICATION (H): Status: ACTIVE | Noted: 2022-02-03

## 2022-10-04 PROBLEM — E11.40 TYPE 2 DIABETES MELLITUS WITH DIABETIC NEUROPATHY, UNSPECIFIED (H): Status: RESOLVED | Noted: 2017-04-09 | Resolved: 2022-02-03

## 2022-11-15 ENCOUNTER — PATIENT OUTREACH (OUTPATIENT)
Dept: FAMILY MEDICINE | Facility: CLINIC | Age: 57
End: 2022-11-15

## 2022-11-15 NOTE — LETTER
November 15, 2022    To  Samson Camarillo  [address] [address] [address]    Your team at [facility] cares about your health. We have reviewed your chart and based on our findings; we are making the following recommendations to better manage your health.     You are in particular need of attention regarding the following:     Schedule a DIABETIC EYE EXAM.  This exam is done with an optometrist, annually. You can schedule this appointment with your eye doctor.  If you need a referral, please let us know.      Topic Date Due     Hepatitis B Vaccine (1 of 3 - 3-dose series) Never done     Pneumococcal Vaccine (2 - PCV) 07/27/2010   If you have already completed these items, please contact the clinic via phone or   RecycleMatchhart so your care team can review and update your records. Thank you for   choosing [facility] for your healthcare needs. For any questions,   concerns, or to schedule an appointment please contact our clinic.    Healthy Regards,      Your [facility] Care Team

## 2022-11-15 NOTE — TELEPHONE ENCOUNTER
Patient Quality Outreach    Patient is due for the following:   Diabetes -  A1C and Eye Exam      Topic Date Due     Hepatitis B Vaccine (1 of 3 - 3-dose series) Never done     Pneumococcal Vaccine (2 - PCV) 07/27/2010       Next Steps:   Schedule a lab only visit for A1C office visit for Eye Exam    Type of outreach:    Sent letter.      Questions for provider review:    None     Carlyn Prado

## 2023-05-13 DIAGNOSIS — I10 HYPERTENSION GOAL BP (BLOOD PRESSURE) < 140/90: ICD-10-CM

## 2023-05-17 RX ORDER — LISINOPRIL 20 MG/1
TABLET ORAL
Qty: 30 TABLET | Refills: 0 | Status: SHIPPED | OUTPATIENT
Start: 2023-05-17 | End: 2023-05-25

## 2023-05-25 DIAGNOSIS — I10 HYPERTENSION GOAL BP (BLOOD PRESSURE) < 140/90: ICD-10-CM

## 2023-05-25 NOTE — TELEPHONE ENCOUNTER
Insurance requires 90 day supply, please send new script.    lisinopril (ZESTRIL) 20 MG tablet 30 tablet 0 5/17/2023

## 2023-06-02 RX ORDER — LISINOPRIL 20 MG/1
20 TABLET ORAL DAILY
Qty: 90 TABLET | Refills: 0 | Status: SHIPPED | OUTPATIENT
Start: 2023-06-02 | End: 2023-07-27

## 2023-06-16 DIAGNOSIS — E11.29 TYPE 2 DIABETES MELLITUS WITH OTHER DIABETIC KIDNEY COMPLICATION (H): ICD-10-CM

## 2023-07-12 ENCOUNTER — TELEPHONE (OUTPATIENT)
Dept: FAMILY MEDICINE | Facility: CLINIC | Age: 58
End: 2023-07-12

## 2023-07-12 NOTE — TELEPHONE ENCOUNTER
Reason for Call:  Appointment Request    Patient requesting this type of appt:  Preventive     Requested provider: Mick Mariano    Reason patient unable to be scheduled: Not with their preferred provider    When does patient want to be seen/preferred time: 1-2 weeks    Comments: Pt needs to be placed on a cancelaltion list for an annual and medication refills     Okay to leave a detailed message?: Yes at Cell number on file:    Telephone Information:   Mobile [phone]       Call taken on 7/12/2023 at 3:39 PM by Kate Ann

## 2023-07-27 ENCOUNTER — VIRTUAL VISIT (OUTPATIENT)
Dept: FAMILY MEDICINE | Facility: CLINIC | Age: 58
End: 2023-07-27
Payer: COMMERCIAL

## 2023-07-27 DIAGNOSIS — I10 HYPERTENSION GOAL BP (BLOOD PRESSURE) < 140/90: ICD-10-CM

## 2023-07-27 DIAGNOSIS — E78.5 HYPERLIPIDEMIA LDL GOAL <100: ICD-10-CM

## 2023-07-27 DIAGNOSIS — E11.29 TYPE 2 DIABETES MELLITUS WITH OTHER DIABETIC KIDNEY COMPLICATION (H): Primary | ICD-10-CM

## 2023-07-27 PROCEDURE — 99214 OFFICE O/P EST MOD 30 MIN: CPT | Mod: 95 | Performed by: INTERNAL MEDICINE

## 2023-07-27 RX ORDER — ATORVASTATIN CALCIUM 40 MG/1
40 TABLET, FILM COATED ORAL DAILY
Qty: 90 TABLET | Refills: 3 | Status: SHIPPED | OUTPATIENT
Start: 2023-07-27 | End: 2024-10-03

## 2023-07-27 RX ORDER — LISINOPRIL 20 MG/1
20 TABLET ORAL DAILY
Qty: 90 TABLET | Refills: 1 | Status: SHIPPED | OUTPATIENT
Start: 2023-07-27 | End: 2024-01-17

## 2023-07-27 NOTE — PROGRESS NOTES
Samson is a 57 year old who is being evaluated via a billable telephone visit.      What phone number would you like to be contacted at? [phone]   How would you like to obtain your AVS? Mail a copy    [facility] Internal Medicine Progress Note           Assessment and Plan:     Type 2 diabetes mellitus with other diabetic kidney complication (H)  - REVIEW OF HEALTH MAINTENANCE PROTOCOL ORDERS  - metFORMIN (GLUCOPHAGE) 1000 MG tablet; Take 1 tablet (1,000 mg) by mouth 2 times daily (with meals)  - Albumin Random Urine Quantitative with Creat Ratio; Future  - Comprehensive metabolic panel; Future  - CBC with platelets and differential; Future  - ASPIRIN NOT PRESCRIBED (INTENTIONAL); Please choose reason not prescribed from choices below.  - PRIMARY CARE FOLLOW-UP SCHEDULING; Future    Hyperlipidemia LDL goal <100  - REVIEW OF HEALTH MAINTENANCE PROTOCOL ORDERS  - atorvastatin (LIPITOR) 40 MG tablet; Take 1 tablet (40 mg) by mouth daily  - Lipid panel reflex to direct LDL Non-fasting; Future  - Comprehensive metabolic panel; Future  - ASPIRIN NOT PRESCRIBED (INTENTIONAL); Please choose reason not prescribed from choices below.  - PRIMARY CARE FOLLOW-UP SCHEDULING; Future    Hypertension goal BP (blood pressure) < 140/90  - REVIEW OF HEALTH MAINTENANCE PROTOCOL ORDERS  - lisinopril (ZESTRIL) 20 MG tablet; Take 1 tablet (20 mg) by mouth daily  - Albumin Random Urine Quantitative with Creat Ratio; Future  - Comprehensive metabolic panel; Future  - ASPIRIN NOT PRESCRIBED (INTENTIONAL); Please choose reason not prescribed from choices below.  - PRIMARY CARE FOLLOW-UP SCHEDULING; Future         Interval History:     Diabetes:   He presents for follow up of diabetes.    He is not checking blood glucose.         He has no concerns regarding his diabetes at this time.  He is having numbness in feet and weight loss.  The patient has not had a diabetic eye exam in the last 12 months.      Hypertension: He presents  for follow up of hypertension.  He does check blood pressure  regularly outside of the clinic. Outpatient blood pressures have not been over 140/90. He follows a low salt diet.     Hyperlipidemia follow-up  Taking statins: Atorvastatin 40 mg.  Adverse effects: None  Other medications: None  Supplements: None  Abnormal liver function tests: No  Contraindicated medications: None  Daily alcohol use: No  Aspirin: No.              Significant Problems:   Patient Active Problem List   Diagnosis     Anxiety     Psoriasis     HYPERLIPIDEMIA LDL GOAL <100     Erectile dysfunction     Hypertension goal BP (blood pressure) < 140/90     Overweight (BMI 25.0-29.9)     Uncontrolled type 2 diabetes mellitus with microalbuminuria              Review of Systems:   CONSTITUTIONAL: NEGATIVE for fever, chills, change in weight  INTEGUMENTARY/SKIN: NEGATIVE for worrisome rashes, moles or lesions  EYES: NEGATIVE for vision changes or irritation  ENT/MOUTH: NEGATIVE for ear, mouth and throat problems  RESP: NEGATIVE for significant cough or SOB  CV: NEGATIVE for chest pain, palpitations or peripheral edema  GI: NEGATIVE for nausea, abdominal pain, heartburn, or change in bowel habits  : NEGATIVE for frequency, dysuria, or hematuria  MUSCULOSKELETAL: NEGATIVE for significant arthralgias or myalgia  NEURO: NEGATIVE for weakness, dizziness or paresthesias  ENDOCRINE: NEGATIVE for temperature intolerance, skin/hair changes  HEME: NEGATIVE for bleeding problems  PSYCHIATRIC: NEGATIVE for changes in mood or affect             Physical Exam:   Vital signs not obtained due to nature of visit.    Remainder of exam not obtained due to nature of visit.          Data:   Epic reviewed.     Disposition:  Follow-up in 24 weeks.      Mick Mariano MD  Internal Medicine  [facility] Team    Phone call duration: 12 minutes  Start: 5:30 PM  End: 5:42 PM

## 2023-07-27 NOTE — PROGRESS NOTES
[address] [address]  Phone: [phone]  Primary Provider: Mick Mariano  Pre-op Performing Provider: MICK MARIANO    {Provider  Link to PREOP SmartSet  Use this to apply standard patient instructions to AVS; includes medication directions, common orders, guidelines for anemia, warfarin, additional testing   :520525}  PREOPERATIVE EVALUATION:  Today's date: 7/27/2023    Samson Camarillo is a 57 year old male who presents for a preoperative evaluation.      7/27/2023    11:01 AM   Additional Questions   Roomed by Angie       Surgical Information:  Surgery/Procedure: ***  Surgery Location: ***  Surgeon: ***  Surgery Date: ***  Time of Surgery: ***  Where patient plans to recover: {Preop post recovery plans :516005}  Fax number for surgical facility: {:456151}    {2021 Provider Charting Preference for Preop :321549}    Subjective       HPI related to upcoming procedure: ***    {Click here to pull in Questionnaire Data after Qnr completed :460593}  Health Care Directive:  Patient does not have a Health Care Directive or Living Will: {ADVANCE_DIRECTIVE_STATUS:092952}    Preoperative Review of :  {Mnpmpreport:350480}  {Review MNPMP for all patients per ICSI MNPMP Profile:081225}    {Chronic problem details (Optional) :645350}    Review of Systems  {ROS Preop Choices:093149}    Patient Active Problem List    Diagnosis Date Noted    Uncontrolled type 2 diabetes mellitus with microalbuminuria 02/03/2022     Priority: Medium    Hypertension goal BP (blood pressure) < 140/90 04/09/2017     Priority: Medium    Overweight (BMI 25.0-29.9) 04/09/2017     Priority: Medium    Erectile dysfunction      Priority: Medium    HYPERLIPIDEMIA LDL GOAL <100 10/31/2010     Priority: Medium    Anxiety      Priority: Medium    Psoriasis      Priority: Medium      Past Medical History:   Diagnosis Date    Anxiety     Chemical dependency (H)     Colon  polyps 6/15    Diabetes (H)     Elevated blood sugar     Erectile dysfunction     History of chest pain 2010    Hyperlipaemia     Hypertension     Psoriasis     Uncontrolled type 2 diabetes with neuropathy 4/9/2017     Past Surgical History:   Procedure Laterality Date    COLONOSCOPY N/A 6/15/2015    Procedure: COLONOSCOPY;  Surgeon: Jonnie Dwyer MD;  Location: [facility] OR    COLONOSCOPY N/A 4/11/2022    Procedure: COLONOSCOPY, FLEXIBLE, WITH LESION REMOVAL USING SNARE;  Surgeon: Vikash De Paz MD;  Location: [facility] OR    COLONOSCOPY WITH CO2 INSUFFLATION N/A 6/15/2015    Procedure: COLONOSCOPY WITH CO2 INSUFFLATION;  Surgeon: Jonnie Dwyer MD;  Location: [facility] OR    COLONOSCOPY WITH CO2 INSUFFLATION N/A 4/11/2022    Procedure: COLONOSCOPY, WITH CO2 INSUFFLATION;  Surgeon: Vikash De Paz MD;  Location: [facility] OR    ECHO STRESS TEST  2010    ENDOSCOPIC SINUS SURGERY  2/2015    right maxillary sinus    SINUS SURGERY Bilateral 2-18-16    Dr. Quesada    SINUS SURGERY  01/2016    [facility] AFF PREP FACE/ORAL PROST UNLISTED       Current Outpatient Medications   Medication Sig Dispense Refill    aspirin 81 MG tablet Take by mouth daily (with dinner)       atorvastatin (LIPITOR) 40 MG tablet TAKE 1 TABLET BY MOUTH EVERY DAY 90 tablet 3    bisacodyl (DULCOLAX) 5 MG EC tablet Take 4 tablets (20 mg) by mouth See Admin Instructions Follow the instructions provided by the clinic 4 tablet 0    blood glucose (NO BRAND SPECIFIED) lancets standard Use to test blood sugar 2 times daily. 60 each 11    blood glucose (NO BRAND SPECIFIED) test strip Use to test blood sugar 2 times daily 60 strip 11    blood glucose monitoring (NO BRAND SPECIFIED) meter device kit Use to test blood sugar 2 times daily. 1 kit 0    glipiZIDE (GLUCOTROL) 5 MG tablet TAKE 1 TABLET (5 MG) BY MOUTH 2 TIMES DAILY (BEFORE MEALS) 180 tablet 0    lisinopril (ZESTRIL) 20 MG tablet Take 1 tablet (20 mg) by mouth daily 90 tablet 0    metFORMIN  (GLUCOPHAGE) 1000 MG tablet TAKE 1 TABLET BY MOUTH TWICE A DAY WITH MEALS 180 tablet 0    multivitamin, therapeutic (THERA-VIT) TABS tablet Take 1 tablet by mouth in the morning.      ORDER FOR DME Equipment being ordered:  Glucose monitor and test strips to match . Must test twice a day  Dispense one kit and 3 months of test strips with 4 refills. 1 each PRN    polyethylene glycol (GOLYTELY) 236 g suspension Take 4,000 mLs by mouth See Admin Instructions Follow the instructions provided by the clinic 4000 mL 0    Simethicone 125 MG TABS Take 125 mg by mouth See Admin Instructions --Take tablet after finishing second half of Golytely with half a glass of water. 1 tablet 0    tadalafil (CIALIS) 10 MG tablet Take 1 tablet (10 mg) by mouth daily as needed (erectile dysfunction) 30 tablet 11       Allergies   Allergen Reactions    Jardiance [Empagliflozin]      Causes cystitis.    Other Drug Allergy (See Comments)      Liver problems after antifungal for toenails        Social History     Tobacco Use    Smoking status: Former     Types: Cigarettes     Quit date: 2019     Years since quittin.3    Smokeless tobacco: Never   Substance Use Topics    Alcohol use: Yes     Alcohol/week: 0.0 standard drinks of alcohol     Comment: 1 drink per month     {FAMILY HISTORY (Optional):343459186}  History   Drug Use    Types: Marijuana     Comment: off and on/weekly         Objective     There were no vitals taken for this visit.    Physical Exam  {EXAM Preop Choices:666511}    Recent Labs   Lab Test 22  1514      POTASSIUM 3.7   CR 0.86   A1C 11.3*        Diagnostics:  {LABS:181269}   {EK}    Revised Cardiac Risk Index (RCRI):  The patient has the following serious cardiovascular risks for perioperative complications:  {PREOP REVISED CARDIAC RISK INDEX (RCRI) :252863}     RCRI Interpretation: {REVISED CARDIAC RISK INTERPRETATION :553056}         Signed Electronically by: Mick Mariano MD  Copy of  this evaluation report is provided to requesting physician.    {Provider Resources  Preop Critical access hospital Preop Guidelines  Revised Cardiac Risk Index :574977}

## 2023-08-18 DIAGNOSIS — E11.29 TYPE 2 DIABETES MELLITUS WITH OTHER DIABETIC KIDNEY COMPLICATION (H): ICD-10-CM

## 2023-08-18 RX ORDER — GLIPIZIDE 5 MG/1
5 TABLET ORAL
Qty: 180 TABLET | Refills: 0 | Status: SHIPPED | OUTPATIENT
Start: 2023-08-18 | End: 2023-11-12

## 2023-08-29 ENCOUNTER — TELEPHONE (OUTPATIENT)
Dept: FAMILY MEDICINE | Facility: CLINIC | Age: 58
End: 2023-08-29
Payer: COMMERCIAL

## 2023-08-29 NOTE — LETTER
August 29, 2023    Samson Camarillo  [address] [address]  [place] [address]    Dear Samson,    At [facility] we care about your health and are committed to providing quality patient care.     Here is a list of Health Maintenance topics that are due now or due soon:  Health Maintenance Due   Topic Date Due    HEPATITIS B IMMUNIZATION (1 of 3 - 3-dose series) Never done    Pneumococcal Vaccine: Pediatrics (0 to 5 Years) and At-Risk Patients (6 to 64 Years) (2 - PCV) 07/27/2010    LUNG CANCER SCREENING  Never done    EYE EXAM  05/08/2019    A1C  08/03/2022    YEARLY PREVENTIVE VISIT  02/03/2023    BMP  02/03/2023    LIPID  02/03/2023    MICROALBUMIN  02/03/2023    DIABETIC FOOT EXAM  02/03/2023        We are recommending that you:  Schedule a WELLNESS (Preventative/Physical) APPOINTMENT with your primary care provider. If you go elsewhere for your wellness appointments then please disregard this reminder    ,   Hyptertension: If you have a home blood pressure monitor, please respond to this message with your latest home blood pressure reading. If you do not, please schedule a free blood pressure check with our clinic.    ,    Diabetic Eye Exam is due:  If this was done within the last 12 months then please contact the clinic or respond to this message with the date and location so we can update your records.     - Schedule a Lab appointment for Fasting Labs which was order last visit.       To schedule an appointment or discuss this further, you may contact us by phone at the [facility] at [phone] or online through the patient [email or web address] @ [email or web address]    Thank you for trusting [facility] and we appreciate the opportunity to serve you.  We look forward to supporting your healthcare needs in the future.    Your partners in health,      Quality Committee at [facility]

## 2023-08-29 NOTE — TELEPHONE ENCOUNTER
Patient Quality Outreach    Patient is due for the following:   Diabetes -  A1C, LDL (Fasting), Eye Exam, Microalbumin, and Foot Exam  Hypertension -  BP check  Physical Preventive Adult Physical    Next Steps:   Schedule a lab only visit for diabetes and  Adult Preventative    Type of outreach:    Sent letter.      Questions for provider review:    None           Marycruz Altamirano MA

## 2023-11-08 DIAGNOSIS — E11.29 TYPE 2 DIABETES MELLITUS WITH OTHER DIABETIC KIDNEY COMPLICATION (H): ICD-10-CM

## 2023-11-12 RX ORDER — GLIPIZIDE 5 MG/1
5 TABLET ORAL
Qty: 180 TABLET | Refills: 0 | Status: SHIPPED | OUTPATIENT
Start: 2023-11-12 | End: 2024-05-19

## 2024-01-15 ENCOUNTER — LAB (OUTPATIENT)
Dept: LAB | Facility: CLINIC | Age: 59
End: 2024-01-15
Payer: COMMERCIAL

## 2024-01-15 DIAGNOSIS — E78.5 HYPERLIPIDEMIA LDL GOAL <100: ICD-10-CM

## 2024-01-15 DIAGNOSIS — I10 HYPERTENSION GOAL BP (BLOOD PRESSURE) < 140/90: ICD-10-CM

## 2024-01-15 DIAGNOSIS — E11.29 TYPE 2 DIABETES MELLITUS WITH OTHER DIABETIC KIDNEY COMPLICATION (H): ICD-10-CM

## 2024-01-15 LAB
ALBUMIN SERPL BCG-MCNC: 4.4 G/DL (ref 3.5–5.2)
ALP SERPL-CCNC: 133 U/L (ref 40–150)
ALT SERPL W P-5'-P-CCNC: 43 U/L (ref 0–70)
ANION GAP SERPL CALCULATED.3IONS-SCNC: 12 MMOL/L (ref 7–15)
AST SERPL W P-5'-P-CCNC: 32 U/L (ref 0–45)
BASOPHILS # BLD AUTO: 0.1 10E3/UL (ref 0–0.2)
BASOPHILS NFR BLD AUTO: 1 %
BILIRUB SERPL-MCNC: 0.6 MG/DL
BUN SERPL-MCNC: 12.3 MG/DL (ref 8–23)
CALCIUM SERPL-MCNC: 9.5 MG/DL (ref 8.8–10.2)
CHLORIDE SERPL-SCNC: 99 MMOL/L (ref 98–107)
CHOLEST SERPL-MCNC: 127 MG/DL
CREAT SERPL-MCNC: 0.77 MG/DL (ref 0.67–1.17)
CREAT UR-MCNC: 21.3 MG/DL
DEPRECATED HCO3 PLAS-SCNC: 25 MMOL/L (ref 22–29)
EGFRCR SERPLBLD CKD-EPI 2021: >90 ML/MIN/1.73M2
EOSINOPHIL # BLD AUTO: 0.4 10E3/UL (ref 0–0.7)
EOSINOPHIL NFR BLD AUTO: 5 %
ERYTHROCYTE [DISTWIDTH] IN BLOOD BY AUTOMATED COUNT: 11.7 % (ref 10–15)
FASTING STATUS PATIENT QL REPORTED: YES
GLUCOSE SERPL-MCNC: 365 MG/DL (ref 70–99)
HBA1C MFR BLD: 11.8 % (ref 0–5.6)
HCT VFR BLD AUTO: 39.7 % (ref 40–53)
HDLC SERPL-MCNC: 37 MG/DL
HGB BLD-MCNC: 14.3 G/DL (ref 13.3–17.7)
IMM GRANULOCYTES # BLD: 0 10E3/UL
IMM GRANULOCYTES NFR BLD: 0 %
LDLC SERPL CALC-MCNC: 74 MG/DL
LYMPHOCYTES # BLD AUTO: 2.4 10E3/UL (ref 0.8–5.3)
LYMPHOCYTES NFR BLD AUTO: 27 %
MCH RBC QN AUTO: 30.3 PG (ref 26.5–33)
MCHC RBC AUTO-ENTMCNC: 36 G/DL (ref 31.5–36.5)
MCV RBC AUTO: 84 FL (ref 78–100)
MICROALBUMIN UR-MCNC: <12 MG/L
MICROALBUMIN/CREAT UR: NORMAL MG/G{CREAT}
MONOCYTES # BLD AUTO: 0.6 10E3/UL (ref 0–1.3)
MONOCYTES NFR BLD AUTO: 7 %
NEUTROPHILS # BLD AUTO: 5.6 10E3/UL (ref 1.6–8.3)
NEUTROPHILS NFR BLD AUTO: 61 %
NONHDLC SERPL-MCNC: 90 MG/DL
PLATELET # BLD AUTO: 261 10E3/UL (ref 150–450)
POTASSIUM SERPL-SCNC: 4.5 MMOL/L (ref 3.4–5.3)
PROT SERPL-MCNC: 7.1 G/DL (ref 6.4–8.3)
RBC # BLD AUTO: 4.72 10E6/UL (ref 4.4–5.9)
SODIUM SERPL-SCNC: 136 MMOL/L (ref 135–145)
TRIGL SERPL-MCNC: 78 MG/DL
WBC # BLD AUTO: 9.2 10E3/UL (ref 4–11)

## 2024-01-15 PROCEDURE — 83036 HEMOGLOBIN GLYCOSYLATED A1C: CPT

## 2024-01-15 PROCEDURE — 82043 UR ALBUMIN QUANTITATIVE: CPT

## 2024-01-15 PROCEDURE — 80053 COMPREHEN METABOLIC PANEL: CPT

## 2024-01-15 PROCEDURE — 36415 COLL VENOUS BLD VENIPUNCTURE: CPT

## 2024-01-15 PROCEDURE — 82570 ASSAY OF URINE CREATININE: CPT

## 2024-01-15 PROCEDURE — 85025 COMPLETE CBC W/AUTO DIFF WBC: CPT

## 2024-01-15 PROCEDURE — 80061 LIPID PANEL: CPT

## 2024-01-16 DIAGNOSIS — I10 HYPERTENSION GOAL BP (BLOOD PRESSURE) < 140/90: ICD-10-CM

## 2024-01-17 RX ORDER — LISINOPRIL 20 MG/1
20 TABLET ORAL DAILY
Qty: 60 TABLET | Refills: 0 | Status: SHIPPED | OUTPATIENT
Start: 2024-01-17 | End: 2024-04-18

## 2024-03-28 ENCOUNTER — TELEPHONE (OUTPATIENT)
Dept: FAMILY MEDICINE | Facility: CLINIC | Age: 59
End: 2024-03-28
Payer: COMMERCIAL

## 2024-03-28 NOTE — LETTER
March 28, 2024    Samson Camarillo  [address] [facility] [address]    Dear Samson,    At [facility] we care about your health and are committed to providing quality patient care.     Here is a list of Health Maintenance topics that are due now or due soon:  Health Maintenance Due   Topic Date Due    IPV IMMUNIZATION (2 of 3 - Adult catch-up series) 04/16/2001    Pneumococcal Vaccine: Pediatrics (0 to 5 Years) and At-Risk Patients (6 to 64 Years) (2 of 2 - PCV) 07/27/2010    LUNG CANCER SCREENING  Never done    EYE EXAM  09/28/2022    YEARLY PREVENTIVE VISIT  02/03/2023    DIABETIC FOOT EXAM  02/03/2023    RSV VACCINE (Pregnancy & 60+) (1 - 1-dose 60+ series) Never done    PHQ-2 (once per calendar year)  01/01/2024        We are recommending that you:  Schedule a WELLNESS (Preventative/Physical) APPOINTMENT with your primary care provider. If you go elsewhere for your wellness appointments then please disregard this reminder    ,   Hyptertension: If you have a home blood pressure monitor, please respond to this message with your latest home blood pressure reading. If you do not, please schedule a free blood pressure check with our clinic.    ,    Schedule a Diabetes Follow-Up Office Appointment: You are due to be seen with your primary care provider for a diabetes follow up office appointment. Please schedule this as soon as you are able.    ,    Diabetic Eye Exam is due:  If this was done within the last 12 months then please contact the clinic or respond to this message with the date and location so we can update your records.    , and   Schedule a Nurse-Only appointment to update your immunizations: Your records indicate that you are not up to date with your immunizations, please schedule a nurse-only appointment to get these updated or update them at your next office visit. If this is incorrect, please disregard.    To schedule an appointment or discuss this further,  you may contact us by phone at the [facility] at [phone] or online through the [email or web address] @ [email or web address]    Thank you for trusting [facility] and we appreciate the opportunity to serve you.  We look forward to supporting your healthcare needs in the future.    Your partners in health,      Quality Committee at [facility]

## 2024-03-28 NOTE — TELEPHONE ENCOUNTER
Patient Quality Outreach    Patient is due for the following:   Diabetes -  A1C, Eye Exam, Diabetic Follow-Up Visit, and Foot Exam  Hypertension -  BP check and Hypertension follow-up visit  Physical Preventive Adult Physical    Next Steps:   Schedule a Adult Preventative    Type of outreach:    Sent letter.      Questions for provider review:    None           Marycruz Altamirano MA

## 2024-04-18 DIAGNOSIS — I10 HYPERTENSION GOAL BP (BLOOD PRESSURE) < 140/90: ICD-10-CM

## 2024-04-18 RX ORDER — LISINOPRIL 20 MG/1
20 TABLET ORAL DAILY
Qty: 60 TABLET | Refills: 0 | Status: SHIPPED | OUTPATIENT
Start: 2024-04-18 | End: 2024-10-03

## 2024-04-18 NOTE — TELEPHONE ENCOUNTER
Medication Question or Refill    Contacts         Type Contact Phone/Fax    04/18/2024 11:05 AM CDT Fax (Incoming) [facility]/pharmacy #[id number] - [place] - [address] AT [facility] (Pharmacy) [phone]            What medication are you calling about (include dose and sig)?: lisinopril (ZESTRIL) 20 MG tablet     Preferred Pharmacy:    [facility]/pharmacy #[id number] - [place] - [address] AT [place] [address] [address]  [facility] [address]  Phone: [phone] Fax: [phone]        Controlled Substance Agreement on file:   CSA -- Patient Level:    CSA: None found at the patient level.       Who prescribed the medication?: Vocal, Mick    Do you need a refill? Yes    When did you use the medication last? N/a    Patient offered an appointment? No    Do you have any questions or concerns?  No      Okay to leave a detailed message?: Yes at Cell number on file:    Telephone Information:   Mobile [phone]

## 2024-04-25 ENCOUNTER — OFFICE VISIT (OUTPATIENT)
Dept: FAMILY MEDICINE | Facility: CLINIC | Age: 59
End: 2024-04-25
Payer: COMMERCIAL

## 2024-04-25 VITALS
BODY MASS INDEX: 26.66 KG/M2 | OXYGEN SATURATION: 98 % | DIASTOLIC BLOOD PRESSURE: 84 MMHG | WEIGHT: 180 LBS | SYSTOLIC BLOOD PRESSURE: 148 MMHG | HEART RATE: 85 BPM | HEIGHT: 69 IN | TEMPERATURE: 97 F | RESPIRATION RATE: 16 BRPM

## 2024-04-25 DIAGNOSIS — R06.2 WHEEZING: Primary | ICD-10-CM

## 2024-04-25 PROCEDURE — 99213 OFFICE O/P EST LOW 20 MIN: CPT | Performed by: PHYSICIAN ASSISTANT

## 2024-04-25 RX ORDER — ALBUTEROL SULFATE 90 UG/1
2 AEROSOL, METERED RESPIRATORY (INHALATION) EVERY 6 HOURS PRN
Qty: 18 G | Refills: 0 | Status: SHIPPED | OUTPATIENT
Start: 2024-04-25 | End: 2024-04-25

## 2024-04-25 RX ORDER — ALBUTEROL SULFATE 90 UG/1
2 AEROSOL, METERED RESPIRATORY (INHALATION) EVERY 6 HOURS PRN
Qty: 18 G | Refills: 0 | Status: SHIPPED | OUTPATIENT
Start: 2024-04-25 | End: 2024-05-17

## 2024-04-25 ASSESSMENT — PAIN SCALES - GENERAL: PAINLEVEL: NO PAIN (1)

## 2024-04-25 NOTE — LETTER
April 25, 2024      Samson Camarillo  [address] [address] [address]        To Whom It May Concern:    Samson Camarillo was seen in our clinic due to reported illness (4/10/24 - 4/17/24). He may return to work without restrictions.        Sincerely,        RADHIKA TROY PA-C

## 2024-04-25 NOTE — PROGRESS NOTES
"  Assessment & Plan     Wheezing  - albuterol (PROAIR HFA/PROVENTIL HFA/VENTOLIN HFA) 108 (90 Base) MCG/ACT inhaler; Inhale 2 puffs into the lungs every 6 hours as needed for shortness of breath, wheezing or cough    Pt continues to wheeze. Hx smoking. No other concerns other than needing a note for employer.       BMI  Estimated body mass index is 26.58 kg/m  as calculated from the following:    Height as of this encounter: 1.753 m (5' 9\").    Weight as of this encounter: 81.6 kg (180 lb).       Subjective   Samson is a 58 year old, presenting for the following health issues:  Sick (Sx started April 10t, came down with an extreme runny nose through Friday , than a cough, hacking , deep cough, then a fever . Missed 3 days at work and now his job is requiring him to come back to work. 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 ) and Patient Request for Note/Letter (For work, and absences.)    Samson is a 58 year old male who presents to clinic for a note to give to his employer for a recent illness for which he required respite at home. On 4/10/24, he began having runny nose, watery eyes. He felt warm and chills so he might have had a fever, but did not measure it. As the weekend approached, the symptoms worsened and settled into a cough, wheezing and became more like bronchitis. It was not improving and he called in sick to work 4/15/24-4/17/24. He says he laid in bed, took OTC medications and it has been improving since the 17th. He has a residual little cough. No chest pain or SOB. He has no hx asthma or COPD and does not usually use an inhaler. He has been working since then, but his employer is requesting a note from a medical provider to demonstrate that he was ill and may return to work (he has been working since 4/18). No other concerns for today's visit.     History of Present Illness       Reason for visit:  Follow up for an illness I had from Apr 11th thru Apr 17th  Might have been " "covid related with respatiratory flu like symtoms    He eats 0-1 servings of fruits and vegetables daily.He consumes 0 sweetened beverage(s) daily.He exercises with enough effort to increase his heart rate 9 or less minutes per day.  He exercises with enough effort to increase his heart rate 3 or less days per week. He is missing 1 dose(s) of medications per week.         Review of Systems  Constitutional, HEENT, cardiovascular, pulmonary, gi and gu systems are negative, except as otherwise noted.      Objective    BP (!) 148/84   Pulse 85   Temp 97  F (36.1  C) (Tympanic)   Resp 16   Ht 1.753 m (5' 9\")   Wt 81.6 kg (180 lb)   SpO2 98%   BMI 26.58 kg/m    Body mass index is 26.58 kg/m .  Physical Exam  Vitals reviewed.   Constitutional:       Appearance: Normal appearance. He is not ill-appearing or toxic-appearing.   Cardiovascular:      Rate and Rhythm: Normal rate and regular rhythm.      Heart sounds: No murmur heard.  Pulmonary:      Effort: Pulmonary effort is normal. No respiratory distress.      Breath sounds: Wheezing present.      Comments: throughout  Neurological:      General: No focal deficit present.      Mental Status: He is alert and oriented to person, place, and time.   Psychiatric:         Mood and Affect: Mood normal.         Thought Content: Thought content normal.         Judgment: Judgment normal.         Signed Electronically by: RADHIKA TROY PA-C    "

## 2024-05-17 DIAGNOSIS — R06.2 WHEEZING: ICD-10-CM

## 2024-05-17 DIAGNOSIS — E11.29 TYPE 2 DIABETES MELLITUS WITH OTHER DIABETIC KIDNEY COMPLICATION (H): ICD-10-CM

## 2024-05-17 RX ORDER — ALBUTEROL SULFATE 90 UG/1
2 AEROSOL, METERED RESPIRATORY (INHALATION) EVERY 6 HOURS PRN
Qty: 6.7 G | Refills: 0 | Status: SHIPPED | OUTPATIENT
Start: 2024-05-17 | End: 2024-10-03

## 2024-05-19 RX ORDER — GLIPIZIDE 5 MG/1
5 TABLET ORAL
Qty: 180 TABLET | Refills: 0 | Status: SHIPPED | OUTPATIENT
Start: 2024-05-19 | End: 2024-10-03

## 2024-07-17 DIAGNOSIS — E11.29 TYPE 2 DIABETES MELLITUS WITH OTHER DIABETIC KIDNEY COMPLICATION (H): ICD-10-CM

## 2024-07-17 NOTE — TELEPHONE ENCOUNTER
This writer attempted to contact patient on 07/17/24 to follow up on refill for metformin and left message.      If they call back:  Please find out reason for gap in refills for metformin. Last filled 7/27/23 for a 6 month supply.     Patient's last A1C was 11.8 on 1/15/24. Patient is due for labs and for follow up visit with Dr. Mariano as well. Please assist with scheduling patient as he is not scheduled at this time. Thank you.        Lauren Yang, RN, BSN, PHN  [facility]  Nurse Triage, Family [facility]

## 2024-07-18 NOTE — TELEPHONE ENCOUNTER
This writer attempted to contact patient on 07/18/24. 2nd attempt.      Reason for call medication question and unable to leave message. Voice mail box full.      If patient calls back:    Please find out reason for gap in refills for metformin. Last filled 7/27/23 for a 6 month supply.      Patient's last A1C was 11.8 on 1/15/24. Patient is due for labs and for follow up visit with Dr. Mariano as well. Please assist with scheduling patient as he is not scheduled at this time. Thank you.    Paula Diop RN

## 2024-07-19 NOTE — TELEPHONE ENCOUNTER
This writer attempted to contact patient on 07/19/24      Reason for call below message and unable to leave message.due to mailbox is full.      If patient calls back:   Registered Nurse called.     Please find out reason for gap in refills for metformin. Last filled 7/27/23 for a 6 month supply.      Patient's last A1C was 11.8 on 1/15/24. Patient is due for labs and for follow up visit with Dr. Mariano as well. Please assist with scheduling patient as he is not scheduled at this time. Thank you.    Micki Batres, RN

## 2024-08-07 ENCOUNTER — TELEPHONE (OUTPATIENT)
Dept: FAMILY MEDICINE | Facility: CLINIC | Age: 59
End: 2024-08-07
Payer: COMMERCIAL

## 2024-08-07 NOTE — TELEPHONE ENCOUNTER
Reason for Call:  Appointment Request    Patient requesting this type of appt:  Preventive     Requested provider: Mick Mariano    Reason patient unable to be scheduled: Not within requested timeframe    When does patient want to be seen/preferred time:  Sooner than the soonest with Dr. Mariano    Comments: He missed his appt in February and was wondering if he could get in sooner    Okay to leave a detailed message?: Yes at Cell number on file:    Telephone Information:   Mobile [phone]       Call taken on 8/7/2024 at 4:25 PM by Minna Gregg

## 2024-08-07 NOTE — TELEPHONE ENCOUNTER
LVM for pt that we can schedule an appt and he can be added to the wait list but Preventative appt are not something we can override to be seen sooner.  If pt cb please schedule next available and add to wait list.

## 2024-08-15 ENCOUNTER — TELEPHONE (OUTPATIENT)
Dept: FAMILY MEDICINE | Facility: CLINIC | Age: 59
End: 2024-08-15
Payer: COMMERCIAL

## 2024-08-15 NOTE — TELEPHONE ENCOUNTER
Patient Quality Outreach    Patient is due for the following:   Diabetes -  A1C, Eye Exam, and Foot Exam  Hypertension -  BP check  Physical Annual Wellness Visit    Next Steps:   Patient has upcoming appointment, these items will be addressed at that time.    Type of outreach:    Chart review performed, no outreach needed.      Questions for provider review:    None           Marycruz Altamirano MA

## 2024-10-03 ENCOUNTER — OFFICE VISIT (OUTPATIENT)
Dept: FAMILY MEDICINE | Facility: CLINIC | Age: 59
End: 2024-10-03
Payer: COMMERCIAL

## 2024-10-03 VITALS
WEIGHT: 176 LBS | OXYGEN SATURATION: 98 % | HEIGHT: 69 IN | SYSTOLIC BLOOD PRESSURE: 139 MMHG | DIASTOLIC BLOOD PRESSURE: 86 MMHG | RESPIRATION RATE: 16 BRPM | TEMPERATURE: 97.5 F | BODY MASS INDEX: 26.07 KG/M2 | HEART RATE: 95 BPM

## 2024-10-03 DIAGNOSIS — Z13.89 SCREENING FOR DIABETIC PERIPHERAL NEUROPATHY: ICD-10-CM

## 2024-10-03 DIAGNOSIS — E78.5 HYPERLIPIDEMIA LDL GOAL <100: ICD-10-CM

## 2024-10-03 DIAGNOSIS — Z00.00 ENCOUNTER FOR ANNUAL PHYSICAL EXAM: Primary | ICD-10-CM

## 2024-10-03 DIAGNOSIS — E11.29 TYPE 2 DIABETES MELLITUS WITH OTHER DIABETIC KIDNEY COMPLICATION (H): ICD-10-CM

## 2024-10-03 DIAGNOSIS — I10 HYPERTENSION GOAL BP (BLOOD PRESSURE) < 140/90: ICD-10-CM

## 2024-10-03 DIAGNOSIS — Z12.5 SCREENING FOR PROSTATE CANCER: ICD-10-CM

## 2024-10-03 LAB
BASOPHILS # BLD AUTO: 0.1 10E3/UL (ref 0–0.2)
BASOPHILS NFR BLD AUTO: 1 %
EOSINOPHIL # BLD AUTO: 0.4 10E3/UL (ref 0–0.7)
EOSINOPHIL NFR BLD AUTO: 4 %
ERYTHROCYTE [DISTWIDTH] IN BLOOD BY AUTOMATED COUNT: 11.9 % (ref 10–15)
EST. AVERAGE GLUCOSE BLD GHB EST-MCNC: 266 MG/DL
HBA1C MFR BLD: 10.9 % (ref 0–5.6)
HCT VFR BLD AUTO: 41.9 % (ref 40–53)
HGB BLD-MCNC: 15.4 G/DL (ref 13.3–17.7)
IMM GRANULOCYTES # BLD: 0 10E3/UL
IMM GRANULOCYTES NFR BLD: 0 %
LYMPHOCYTES # BLD AUTO: 2.8 10E3/UL (ref 0.8–5.3)
LYMPHOCYTES NFR BLD AUTO: 28 %
MCH RBC QN AUTO: 30.7 PG (ref 26.5–33)
MCHC RBC AUTO-ENTMCNC: 36.8 G/DL (ref 31.5–36.5)
MCV RBC AUTO: 84 FL (ref 78–100)
MONOCYTES # BLD AUTO: 0.7 10E3/UL (ref 0–1.3)
MONOCYTES NFR BLD AUTO: 7 %
NEUTROPHILS # BLD AUTO: 6.3 10E3/UL (ref 1.6–8.3)
NEUTROPHILS NFR BLD AUTO: 61 %
PLATELET # BLD AUTO: 245 10E3/UL (ref 150–450)
RBC # BLD AUTO: 5.02 10E6/UL (ref 4.4–5.9)
WBC # BLD AUTO: 10.3 10E3/UL (ref 4–11)

## 2024-10-03 PROCEDURE — 99396 PREV VISIT EST AGE 40-64: CPT | Performed by: INTERNAL MEDICINE

## 2024-10-03 PROCEDURE — G0103 PSA SCREENING: HCPCS | Performed by: INTERNAL MEDICINE

## 2024-10-03 PROCEDURE — 83036 HEMOGLOBIN GLYCOSYLATED A1C: CPT | Performed by: INTERNAL MEDICINE

## 2024-10-03 PROCEDURE — 80053 COMPREHEN METABOLIC PANEL: CPT | Performed by: INTERNAL MEDICINE

## 2024-10-03 PROCEDURE — 85025 COMPLETE CBC W/AUTO DIFF WBC: CPT | Performed by: INTERNAL MEDICINE

## 2024-10-03 PROCEDURE — 36415 COLL VENOUS BLD VENIPUNCTURE: CPT | Performed by: INTERNAL MEDICINE

## 2024-10-03 PROCEDURE — 82043 UR ALBUMIN QUANTITATIVE: CPT | Performed by: INTERNAL MEDICINE

## 2024-10-03 PROCEDURE — 82570 ASSAY OF URINE CREATININE: CPT | Performed by: INTERNAL MEDICINE

## 2024-10-03 RX ORDER — LISINOPRIL 20 MG/1
20 TABLET ORAL DAILY
Qty: 90 TABLET | Refills: 3 | Status: SHIPPED | OUTPATIENT
Start: 2024-10-03

## 2024-10-03 RX ORDER — ATORVASTATIN CALCIUM 40 MG/1
40 TABLET, FILM COATED ORAL DAILY
Qty: 90 TABLET | Refills: 3 | Status: SHIPPED | OUTPATIENT
Start: 2024-10-03

## 2024-10-03 RX ORDER — ASPIRIN 81 MG/1
81 TABLET ORAL
Qty: 100 TABLET | Refills: 3 | Status: SHIPPED | OUTPATIENT
Start: 2024-10-03

## 2024-10-03 RX ORDER — PIOGLITAZONE 15 MG/1
15 TABLET ORAL DAILY
Qty: 90 TABLET | Refills: 3 | Status: SHIPPED | OUTPATIENT
Start: 2024-10-03

## 2024-10-03 SDOH — HEALTH STABILITY: PHYSICAL HEALTH: ON AVERAGE, HOW MANY DAYS PER WEEK DO YOU ENGAGE IN MODERATE TO STRENUOUS EXERCISE (LIKE A BRISK WALK)?: 4 DAYS

## 2024-10-03 ASSESSMENT — SOCIAL DETERMINANTS OF HEALTH (SDOH): HOW OFTEN DO YOU GET TOGETHER WITH FRIENDS OR RELATIVES?: PATIENT DECLINED

## 2024-10-03 ASSESSMENT — PAIN SCALES - GENERAL: PAINLEVEL: NO PAIN (0)

## 2024-10-03 NOTE — LETTER
Samson Camarillo  [address] [facility] [address]      To Whom It May Concern:                                                This is regarding the case of Mr. Samson Camarillo. He has been my patient since April 2017. Mr. Camarillo has type 2 diabetes and hypertension. Due to his age and diabetes, my patient has urinary frequency. Therefore, please allow Samson to have multiple breaks to use a restroom. For any questions, you may call my office at [phone].    Sincerely,         Mick Mariano MD  Internal Medicine

## 2024-10-03 NOTE — PROGRESS NOTES
Preventive Care Visit  [facility]  Mick Mariano MD, Internal Medicine  Oct 3, 2024  {Provider  Link to SmartSet :382172}    {PROVIDER CHARTING PREFERENCE:546185}    Francisca Davies is a 59 year old, presenting for the following:  Physical and Diabetes        10/3/2024     2:30 PM   Additional Questions   Roomed by kristan   Accompanied by self         10/3/2024   Forms   Any forms needing to be completed Yes           Health Care Directive  Patient does not have a Health Care Directive or Living Will: {ADVANCE_DIRECTIVE_STATUS:308553}    HPI  ***  {MA/LPN/RN Pre-Provider Visit Orders- hCG/UA/Strep (Optional):010831}  {SUPERLIST (Optional):894036}  {additonal problems for provider to add (Optional):566445}      10/3/2024   General Health   How would you rate your overall physical health? Good   Feel stress (tense, anxious, or unable to sleep) To some extent      (!) STRESS CONCERN      10/3/2024   Nutrition   Three or more servings of calcium each day? (!) I DON'T KNOW   Diet: Low salt    Low fat/cholesterol    Diabetic    Vegetarian/vegan   How many servings of fruit and vegetables per day? (!) 2-3   How many sweetened beverages each day? 0-1       Multiple values from one day are sorted in reverse-chronological order         10/3/2024   Exercise   Days per week of moderate/strenous exercise 4 days            10/3/2024   Social Factors   Frequency of gathering with friends or relatives Patient declined   Worry food won't last until get money to buy more Patient declined   Food not last or not have enough money for food? Patient declined   Do you have housing? (Housing is defined as stable permanent housing and does not include staying ouside in a car, in a tent, in an abandoned building, in an overnight shelter, or couch-surfing.) Patient declined   Are you worried about losing your housing? Patient declined   Lack of transportation? Patient declined   Unable to get utilities  (heat,electricity)? Patient declined            10/3/2024   Fall Risk   Fallen 2 or more times in the past year? No   Trouble with walking or balance? No             10/3/2024   Dental   Dentist two times every year? (!) DECLINE            10/3/2024   TB Screening   Were you born outside of the [place]? No          {Rooming Staff Patient needs a PHQ as part of the AWV.  Use this link to complete and then refresh the note to pull results Link to PHQ2 Assessment :669617}  {USE TO PULL IN PHQ RESULTS FOR TODAY:511546}      10/3/2024   Substance Use   Alcohol more than 3/day or more than 7/wk No   Do you use any other substances recreationally? No        Social History     Tobacco Use    Smoking status: Former     Current packs/day: 0.00     Types: Cigarettes     Quit date: 2019     Years since quittin.5    Smokeless tobacco: Never   Vaping Use    Vaping status: Never Used   Substance Use Topics    Alcohol use: Yes     Alcohol/week: 0.0 standard drinks of alcohol     Comment: 1 drink per month    Drug use: Yes     Types: Marijuana     Comment: off and on/weekly     {Provider  If there are gaps in the social history shown above, please follow the link to update and then refresh the note Link to Social and Substance History :699987}      10/3/2024   STI Screening   New sexual partner(s) since last STI/HIV test? No      Last PSA:   Prostate Specific Antigen Screen   Date Value Ref Range Status   2022 1.76 0.00 - 4.00 ug/L Final     ASCVD Risk   The ASCVD Risk score (Mar DK, et al., 2019) failed to calculate for the following reasons:    The valid total cholesterol range is 130 to 320 mg/dL    {Link to Fracture Risk Assessment Tool (Optional):713347}    {Provider  REQUIRED FOR AWV Use the storyboard to review patient history, after sections have been marked as reviewed, refresh note to capture documentation:173241}   Reviewed and updated as needed this visit by Provider                    {HISTORY  "OPTIONS (Optional):192296}    {ROS Picklists (Optional):160735}     Objective    Exam  There were no vitals taken for this visit.   Estimated body mass index is 26.58 kg/m  as calculated from the following:    Height as of 4/25/24: 1.753 m (5' 9\").    Weight as of 4/25/24: 81.6 kg (180 lb).    Physical Exam  {Exam Choices (Optional):014377}        Signed Electronically by: Mick Mariano MD  {Email feedback regarding this note to [email or web address]   :730442}  "

## 2024-10-04 LAB
ALBUMIN SERPL BCG-MCNC: 4.5 G/DL (ref 3.5–5.2)
ALP SERPL-CCNC: 89 U/L (ref 40–150)
ALT SERPL W P-5'-P-CCNC: 25 U/L (ref 0–70)
ANION GAP SERPL CALCULATED.3IONS-SCNC: 12 MMOL/L (ref 7–15)
AST SERPL W P-5'-P-CCNC: 21 U/L (ref 0–45)
BILIRUB SERPL-MCNC: 0.6 MG/DL
BUN SERPL-MCNC: 13 MG/DL (ref 8–23)
CALCIUM SERPL-MCNC: 9.7 MG/DL (ref 8.8–10.4)
CHLORIDE SERPL-SCNC: 99 MMOL/L (ref 98–107)
CREAT SERPL-MCNC: 0.87 MG/DL (ref 0.67–1.17)
CREAT UR-MCNC: 104 MG/DL
EGFRCR SERPLBLD CKD-EPI 2021: >90 ML/MIN/1.73M2
GLUCOSE SERPL-MCNC: 332 MG/DL (ref 70–99)
HCO3 SERPL-SCNC: 24 MMOL/L (ref 22–29)
MICROALBUMIN UR-MCNC: 13.6 MG/L
MICROALBUMIN/CREAT UR: 13.08 MG/G CR (ref 0–17)
POTASSIUM SERPL-SCNC: 4.1 MMOL/L (ref 3.4–5.3)
PROT SERPL-MCNC: 7.3 G/DL (ref 6.4–8.3)
PSA SERPL DL<=0.01 NG/ML-MCNC: 1.26 NG/ML (ref 0–4.5)
SODIUM SERPL-SCNC: 135 MMOL/L (ref 135–145)

## 2024-10-13 NOTE — PROGRESS NOTES
SUBJECTIVE:   Samson is a 59 year old male who presents today for an annual physical exam.    Health Care Directive  Patient does not have a Health Care Directive or Living Will: Not discussed during today's appointment.        10/3/2024   General Health   How would you rate your overall physical health? Good   Feel stress (tense, anxious, or unable to sleep) To some extent      (!) STRESS CONCERN      10/3/2024   Nutrition   Three or more servings of calcium each day? (!) I DON'T KNOW   Diet: Low salt    Low fat/cholesterol    Diabetic    Vegetarian/vegan   How many servings of fruit and vegetables per day? (!) 2-3   How many sweetened beverages each day? 0-1       Multiple values from one day are sorted in reverse-chronological order         10/3/2024   Exercise   Days per week of moderate/strenous exercise 4 days            10/3/2024   Social Factors   Frequency of gathering with friends or relatives Patient declined   Worry food won't last until get money to buy more Patient declined   Food not last or not have enough money for food? Patient declined   Do you have housing? (Housing is defined as stable permanent housing and does not include staying ouside in a car, in a tent, in an abandoned building, in an overnight shelter, or couch-surfing.) Patient declined   Are you worried about losing your housing? Patient declined   Lack of transportation? Patient declined   Unable to get utilities (heat,electricity)? Patient declined            10/3/2024   Fall Risk   Fallen 2 or more times in the past year? No   Trouble with walking or balance? No             10/3/2024   Dental   Dentist two times every year? (!) DECLINE            10/3/2024   TB Screening   Were you born outside of the [place]? No        Today's PHQ-2 Score:       4/25/2024    11:44 AM   PHQ-2 ( 1999 [employer])   Q1: Little interest or pleasure in doing things 0   Q2: Feeling down, depressed or hopeless 0   PHQ-2 Score 0   Q1: Little interest or pleasure in  doing things Not at all   Q2: Feeling down, depressed or hopeless Not at all   PHQ-2 Score 0         10/3/2024   Substance Use   Alcohol more than 3/day or more than 7/wk No   Do you use any other substances recreationally? No        Social History     Tobacco Use    Smoking status: Former     Current packs/day: 0.00     Types: Cigarettes     Quit date: 2019     Years since quittin.5    Smokeless tobacco: Never   Vaping Use    Vaping status: Never Used   Substance Use Topics    Alcohol use: Yes     Alcohol/week: 0.0 standard drinks of alcohol     Comment: 1 drink per month    Drug use: Yes     Types: Marijuana     Comment: off and on/weekly           10/3/2024   STI Screening   New sexual partner(s) since last STI/HIV test? No      Last PSA:   Prostate Specific Antigen Screen   Date Value Ref Range Status   2022 1.76 0.00 - 4.00 ug/L Final     ASCVD Risk   The ASCVD Risk score (Mar MATTHEWS, et al., 2019) failed to calculate for the following reasons:    The valid total cholesterol range is 130 to 320 mg/dL       Labs reviewed in EPIC  BP Readings from Last 3 Encounters:   10/03/24 139/86   24 (!) 148/84   22 (!) 135/98    Wt Readings from Last 3 Encounters:   10/03/24 79.8 kg (176 lb)   24 81.6 kg (180 lb)   22 87.7 kg (193 lb 6.4 oz)                  Patient Active Problem List   Diagnosis    Anxiety    Psoriasis    HYPERLIPIDEMIA LDL GOAL <100    Erectile dysfunction    Hypertension goal BP (blood pressure) < 140/90    Overweight (BMI 25.0-29.9)    Uncontrolled type 2 diabetes mellitus with microalbuminuria     Past Surgical History:   Procedure Laterality Date    COLONOSCOPY N/A 6/15/2015    Procedure: COLONOSCOPY;  Surgeon: Jonnie Dwyer MD;  Location: [facility]    COLONOSCOPY N/A 2022    Procedure: COLONOSCOPY, FLEXIBLE, WITH LESION REMOVAL USING SNARE;  Surgeon: Vikash De Paz MD;  Location: [facility]    COLONOSCOPY WITH CO2 INSUFFLATION N/A  6/15/2015    Procedure: COLONOSCOPY WITH CO2 INSUFFLATION;  Surgeon: Jonnie Dwyer MD;  Location: [facility]    COLONOSCOPY WITH CO2 INSUFFLATION N/A 2022    Procedure: COLONOSCOPY, WITH CO2 INSUFFLATION;  Surgeon: Vikash De Paz MD;  Location: [facility]    ECHO STRESS TEST      ENDOSCOPIC SINUS SURGERY  2015    right maxillary sinus    SINUS SURGERY Bilateral 16    Dr. Quesada    SINUS SURGERY  2016    [facility]C AFF PREP FACE/ORAL PROST UNLISTED         Social History     Tobacco Use    Smoking status: Former     Current packs/day: 0.00     Types: Cigarettes     Quit date: 2019     Years since quittin.5    Smokeless tobacco: Never   Substance Use Topics    Alcohol use: Yes     Alcohol/week: 0.0 standard drinks of alcohol     Comment: 1 drink per month     Family History   Problem Relation Age of Onset    Hypertension Mother     Heart Disease Mother     Thyroid Disease Mother     Kidney Disease Mother         stage 2 to 3    Diabetes Father     Hypertension Father     Cerebrovascular Disease Father     Diabetes Maternal Grandfather     Cancer Paternal Grandmother     Heart Disease Paternal Grandfather          Allergies   Allergen Reactions    Jardiance [Empagliflozin]      Causes cystitis.    Other Drug Allergy (See Comments)      Liver problems after antifungal for toenails          Reviewed and updated as needed this visit by clinical staff     Meds            Reviewed and updated as needed this visit by Provider    Review of Systems  CONSTITUTIONAL: NEGATIVE for fever, chills, change in weight  INTEGUMENTARY/SKIN: NEGATIVE for worrisome rashes, moles or lesions  EYES: NEGATIVE for vision changes or irritation  ENT/MOUTH: NEGATIVE for ear, mouth and throat problems  RESP: NEGATIVE for significant cough or SOB  CV: NEGATIVE for chest pain, palpitations or peripheral edema  GI: NEGATIVE for nausea, abdominal pain, heartburn, or change in bowel habits  : NEGATIVE for frequency,  "dysuria, or hematuria  MUSCULOSKELETAL: NEGATIVE for significant arthralgias or myalgia  NEURO: NEGATIVE for weakness, dizziness or paresthesias  ENDOCRINE: NEGATIVE for temperature intolerance, skin/hair changes  HEME: NEGATIVE for bleeding problems  PSYCHIATRIC: NEGATIVE for changes in mood or affect    OBJECTIVE:   /86   Pulse 95   Temp 97.5  F (36.4  C) (Tympanic)   Resp 16   Ht 1.759 m (5' 9.25\")   Wt 79.8 kg (176 lb)   SpO2 98%   BMI 25.80 kg/m     Estimated body mass index is 25.8 kg/m  as calculated from the following:    Height as of this encounter: 1.759 m (5' 9.25\").    Weight as of this encounter: 79.8 kg (176 lb).  Physical Exam  GENERAL: alert and no distress  EYES: Eyes grossly normal to inspection and conjunctivae and sclerae normal  HENT: normal cephalic/atraumatic and oral mucous membranes moist  NECK: no adenopathy and thyroid normal to palpation  RESP: lungs clear to auscultation - no rales, rhonchi or wheezes  CV: regular rates and rhythm and no peripheral edema  ABDOMEN: soft, nontender, no hepatosplenomegaly, no masses and bowel sounds normal  MS: no gross musculoskeletal defects noted, no edema  NEURO: Normal strength and tone, mentation intact and speech normal  PSYCH: mentation appears normal, affect normal/bright    Diagnostic Test Results:  Results for orders placed or performed in visit on 10/03/24   HEMOGLOBIN A1C     Status: Abnormal   Result Value Ref Range    Estimated Average Glucose 266 (H) <117 mg/dL    Hemoglobin A1C 10.9 (H) 0.0 - 5.6 %    Narrative    Results consistent with previous, repeat testing unnecessary     Comprehensive metabolic panel     Status: Abnormal   Result Value Ref Range    Sodium 135 135 - 145 mmol/L    Potassium 4.1 3.4 - 5.3 mmol/L    Carbon Dioxide (CO2) 24 22 - 29 mmol/L    Anion Gap 12 7 - 15 mmol/L    Urea Nitrogen 13.0 8.0 - 23.0 mg/dL    Creatinine 0.87 0.67 - 1.17 mg/dL    GFR Estimate >90 >60 mL/min/1.73m2    Calcium 9.7 8.8 - 10.4 " mg/dL    Chloride 99 98 - 107 mmol/L    Glucose 332 (H) 70 - 99 mg/dL    Alkaline Phosphatase 89 40 - 150 U/L    AST 21 0 - 45 U/L    ALT 25 0 - 70 U/L    Protein Total 7.3 6.4 - 8.3 g/dL    Albumin 4.5 3.5 - 5.2 g/dL    Bilirubin Total 0.6 <=1.2 mg/dL   PSA, screen     Status: Normal   Result Value Ref Range    Prostate Specific Antigen Screen 1.26 0.00 - 4.50 ng/mL    Narrative    This result is obtained using the Roche Elecsys total PSA method on the brendan e801 immunoassay analyzer, which is an ultrasensitive method. Results obtained with different assay methods or kits cannot be used interchangeably.  This test is intended for initial prostate cancer screening. PSA values exceeding the age-specific limits are suspicious for prostate disease, but additional testing, such as prostate biopsy, is needed to diagnose prostate pathology. The American Cancer Society recommends annual examination with digital rectal examination and serum PSA beginning at age 50 and for men with a life expectancy of at least 10 years after detection of prostate cancer. For men in high-risk groups, such as  Americans or men with a first-degree relative diagnosed at a younger age, testing should begin at a younger age. It is generally recommended that information be provided to patients about the benefits and limitations of testing and treatment so they can make informed decisions.   CBC with platelets and differential     Status: Abnormal   Result Value Ref Range    WBC Count 10.3 4.0 - 11.0 10e3/uL    RBC Count 5.02 4.40 - 5.90 10e6/uL    Hemoglobin 15.4 13.3 - 17.7 g/dL    Hematocrit 41.9 40.0 - 53.0 %    MCV 84 78 - 100 fL    MCH 30.7 26.5 - 33.0 pg    MCHC 36.8 (H) 31.5 - 36.5 g/dL    RDW 11.9 10.0 - 15.0 %    Platelet Count 245 150 - 450 10e3/uL    % Neutrophils 61 %    % Lymphocytes 28 %    % Monocytes 7 %    % Eosinophils 4 %    % Basophils 1 %    % Immature Granulocytes 0 %    Absolute Neutrophils 6.3 1.6 - 8.3 10e3/uL     Absolute Lymphocytes 2.8 0.8 - 5.3 10e3/uL    Absolute Monocytes 0.7 0.0 - 1.3 10e3/uL    Absolute Eosinophils 0.4 0.0 - 0.7 10e3/uL    Absolute Basophils 0.1 0.0 - 0.2 10e3/uL    Absolute Immature Granulocytes 0.0 <=0.4 10e3/uL   Albumin Random Urine Quantitative with Creat Ratio     Status: None   Result Value Ref Range    Creatinine Urine mg/dL 104.0 mg/dL    Albumin Urine mg/L 13.6 mg/L    Albumin Urine mg/g Cr 13.08 0.00 - 17.00 mg/g Cr   CBC with platelets and differential     Status: Abnormal    Narrative    The following orders were created for panel order CBC with platelets and differential.  Procedure                               Abnormality         Status                     ---------                               -----------         ------                     CBC with platelets and d...[[id number]]  Abnormal            Final result                 Please view results for these tests on the individual orders.       ASSESSMENT/PLAN:     Encounter for annual physical exam  - REVIEW OF HEALTH MAINTENANCE PROTOCOL ORDERS  - Comprehensive metabolic panel  - CBC with platelets and differential    Type 2 diabetes mellitus with other diabetic kidney complication (H)  - HEMOGLOBIN A1C  - REVIEW OF HEALTH MAINTENANCE PROTOCOL ORDERS  - metFORMIN (GLUCOPHAGE) 1000 MG tablet; Take 1 tablet (1,000 mg) by mouth 2 times daily (with meals).  - pioglitazone (ACTOS) 15 MG tablet; Take 1 tablet (15 mg) by mouth daily.  - Comprehensive metabolic panel  - REVIEW OF HEALTH MAINTENANCE PROTOCOL ORDERS  - aspirin 81 MG EC tablet; Take 1 tablet (81 mg) by mouth daily (with dinner).  - Albumin Random Urine Quantitative with Creat Ratio    Hyperlipidemia LDL goal <100  - REVIEW OF HEALTH MAINTENANCE PROTOCOL ORDERS  - atorvastatin (LIPITOR) 40 MG tablet; Take 1 tablet (40 mg) by mouth daily.  - Comprehensive metabolic panel  - REVIEW OF HEALTH MAINTENANCE PROTOCOL ORDERS  - aspirin 81 MG EC tablet; Take 1 tablet (81 mg) by mouth  "daily (with dinner).    Hypertension goal BP (blood pressure) < 140/90  - REVIEW OF HEALTH MAINTENANCE PROTOCOL ORDERS  - lisinopril (ZESTRIL) 20 MG tablet; Take 1 tablet (20 mg) by mouth daily.  - Comprehensive metabolic panel  - REVIEW OF HEALTH MAINTENANCE PROTOCOL ORDERS  - Albumin Random Urine Quantitative with Creat Ratio    Screening for diabetic peripheral neuropathy  - FOOT EXAM    Screening for prostate cancer  - PSA, screen     Patient has been advised of split billing requirements and indicates understanding: Yes      Counseling  Special attention given to:        Regular exercise       Healthy diet/nutrition       Prostate cancer screening       The ASCVD Risk score (Mar MATTHEWS, et al., 2019) failed to calculate for the following reasons:    The valid total cholesterol range is 130 to 320 mg/dL      BMI  Estimated body mass index is 25.8 kg/m  as calculated from the following:    Height as of this encounter: 1.759 m (5' 9.25\").    Weight as of this encounter: 79.8 kg (176 lb).   Weight management plan: diet and exercise.      He reports that he quit smoking about 5 years ago. His smoking use included cigarettes. He has never used smokeless tobacco.            Signed Electronically by: Mick Mariano MD  "

## 2024-10-22 ENCOUNTER — TELEPHONE (OUTPATIENT)
Dept: FAMILY MEDICINE | Facility: CLINIC | Age: 59
End: 2024-10-22

## 2024-10-22 NOTE — LETTER
"Samson Camarillo  [address] [facility] [address]    Dear ,                                I apologize for the delay. Lab tests show that your diabetes is improving based on your lower hemoglobin A1c. However, your hemoglobin A1c is still above goal, which is less than 8.0%. Please continue Pioglitazone and Metformin. Other tests show normal kidney function, normal liver function and electrolytes (\"minerals\" such as potassium, chloride, sodium and calcium) are normal. Serum PSA is normal, indicating low risk of prostate cancer. For any questions, you may call my office at [phone].    Sincerely,         Mick Mariano MD  Internal Medicine               "

## 2024-10-22 NOTE — TELEPHONE ENCOUNTER
Pt calling regarding lab results from 10/3. No results note in chart.     Routing to provider to review results.     Lianet Gamino RN

## 2024-12-12 NOTE — TELEPHONE ENCOUNTER
Received provider signed result letter and lab results from 10/4/2024. Mailing to patient's home address. This will go out in tomorrow's mail.  Millie [facility]   Primary Care

## 2025-04-17 ENCOUNTER — VIRTUAL VISIT (OUTPATIENT)
Dept: FAMILY MEDICINE | Facility: CLINIC | Age: 60
End: 2025-04-17
Payer: COMMERCIAL

## 2025-04-17 DIAGNOSIS — Z98.890 STATUS POST SURGICAL REMOVAL OF NAIL MATRIX OF TOE OF LEFT FOOT: ICD-10-CM

## 2025-04-17 DIAGNOSIS — S91.209A AVULSION OF TOENAIL, INITIAL ENCOUNTER: Primary | ICD-10-CM

## 2025-04-17 PROCEDURE — 98012 SYNCH AUDIO-ONLY EST SF 10: CPT | Performed by: INTERNAL MEDICINE

## 2025-04-17 NOTE — PROGRESS NOTES
Samson is a 59 year old who is being evaluated via a billable video visit.    What phone number would you like to be contacted at? [phone]  How would you like to obtain your AVS? Mail a copy    [facility] Internal Medicine Progress Note           Assessment   (S91.209A) Avulsion of toenail, initial encounter  (primary encounter diagnosis)    (Z98.890) Status post surgical removal of nail matrix of toe of left foot          Plan   Work ability composed. Patient does not require additional antibiotics.        Interval History:     Reason for visit: ED follow-up   Onset: 3/9/2025  Description: Nail avulsion while working [place] [employer] [place]  Course: improving  Intensity: severe at the onset.  Associated symptoms: none  History: none  Evaluation: yes. Seen at [facility] in [place], [place].  Alleviating factor: Nail removal of the avulsed left great toe.  Complications: none            Significant Problems:     Patient Active Problem List   Diagnosis    Anxiety    Psoriasis    HYPERLIPIDEMIA LDL GOAL <100    Erectile dysfunction    Hypertension goal BP (blood pressure) < 140/90    Overweight (BMI 25.0-29.9)    Uncontrolled type 2 diabetes mellitus with microalbuminuria              Review of Systems:     CONSTITUTIONAL: NEGATIVE for fever, chills, change in weight  INTEGUMENTARY/SKIN: As above.  EYES: NEGATIVE for vision changes or irritation  ENT/MOUTH: NEGATIVE for ear, mouth and throat problems  RESP: NEGATIVE for significant cough or SOB  CV: NEGATIVE for chest pain, palpitations or peripheral edema  GI: NEGATIVE for nausea, abdominal pain, heartburn, or change in bowel habits  : NEGATIVE for frequency, dysuria, or hematuria  MUSCULOSKELETAL: NEGATIVE for significant arthralgias or myalgia  NEURO: NEGATIVE for weakness, dizziness or paresthesias  ENDOCRINE: NEGATIVE for temperature intolerance, skin/hair changes  HEME: NEGATIVE for bleeding problems  PSYCHIATRIC: NEGATIVE  for changes in mood or affect             Physical Exam:   Vital signs and physical exam not obtained due to nature of visit.          Data:   Epic reviewed.     Disposition:  Follow-up on 10/3/2025.    Mick Mariano MD  Internal Medicine  [facility] Team     Phone call duration: 15 minutes  Start: 5:30 PM  End: 5:45 PM

## 2025-09-03 ENCOUNTER — PATIENT OUTREACH (OUTPATIENT)
Dept: CARE COORDINATION | Facility: CLINIC | Age: 60
End: 2025-09-03
Payer: COMMERCIAL

## (undated) DEVICE — KIT ENDO FIRST STEP DISINFECTANT 200ML W/POUCH EP-4

## (undated) DEVICE — PAD CHUX UNDERPAD 23X24" 7136

## (undated) RX ORDER — FENTANYL CITRATE 50 UG/ML
INJECTION, SOLUTION INTRAMUSCULAR; INTRAVENOUS
Status: DISPENSED
Start: 2022-04-11